# Patient Record
Sex: MALE | Race: WHITE | NOT HISPANIC OR LATINO | Employment: OTHER | URBAN - METROPOLITAN AREA
[De-identification: names, ages, dates, MRNs, and addresses within clinical notes are randomized per-mention and may not be internally consistent; named-entity substitution may affect disease eponyms.]

---

## 2018-11-21 ENCOUNTER — HOSPITAL ENCOUNTER (OUTPATIENT)
Dept: LAB | Facility: MEDICAL CENTER | Age: 78
End: 2018-11-21
Payer: MEDICARE

## 2018-11-21 LAB
ALBUMIN SERPL BCP-MCNC: 3.8 G/DL (ref 3.2–4.9)
ALBUMIN/GLOB SERPL: 1.4 G/DL
ALP SERPL-CCNC: 95 U/L (ref 30–99)
ALT SERPL-CCNC: 15 U/L (ref 2–50)
ANION GAP SERPL CALC-SCNC: 9 MMOL/L (ref 0–11.9)
AST SERPL-CCNC: 51 U/L (ref 12–45)
BASOPHILS # BLD AUTO: 0.2 % (ref 0–1.8)
BASOPHILS # BLD: 0.01 K/UL (ref 0–0.12)
BILIRUB SERPL-MCNC: 0.6 MG/DL (ref 0.1–1.5)
BUN SERPL-MCNC: 17 MG/DL (ref 8–22)
CALCIUM SERPL-MCNC: 9.5 MG/DL (ref 8.5–10.5)
CHLORIDE SERPL-SCNC: 103 MMOL/L (ref 96–112)
CO2 SERPL-SCNC: 25 MMOL/L (ref 20–33)
CREAT SERPL-MCNC: 1.37 MG/DL (ref 0.5–1.4)
DIGOXIN SERPL-MCNC: 0.3 NG/ML (ref 0.8–2)
EOSINOPHIL # BLD AUTO: 0.18 K/UL (ref 0–0.51)
EOSINOPHIL NFR BLD: 3.1 % (ref 0–6.9)
ERYTHROCYTE [DISTWIDTH] IN BLOOD BY AUTOMATED COUNT: 50.5 FL (ref 35.9–50)
GLOBULIN SER CALC-MCNC: 2.7 G/DL (ref 1.9–3.5)
GLUCOSE SERPL-MCNC: 101 MG/DL (ref 65–99)
HCT VFR BLD AUTO: 46.3 % (ref 42–52)
HGB BLD-MCNC: 15 G/DL (ref 14–18)
IMM GRANULOCYTES # BLD AUTO: 0.03 K/UL (ref 0–0.11)
IMM GRANULOCYTES NFR BLD AUTO: 0.5 % (ref 0–0.9)
LYMPHOCYTES # BLD AUTO: 1.18 K/UL (ref 1–4.8)
LYMPHOCYTES NFR BLD: 20.1 % (ref 22–41)
MCH RBC QN AUTO: 28.7 PG (ref 27–33)
MCHC RBC AUTO-ENTMCNC: 32.4 G/DL (ref 33.7–35.3)
MCV RBC AUTO: 88.7 FL (ref 81.4–97.8)
MONOCYTES # BLD AUTO: 0.31 K/UL (ref 0–0.85)
MONOCYTES NFR BLD AUTO: 5.3 % (ref 0–13.4)
NEUTROPHILS # BLD AUTO: 4.15 K/UL (ref 1.82–7.42)
NEUTROPHILS NFR BLD: 70.8 % (ref 44–72)
NRBC # BLD AUTO: 0 K/UL
NRBC BLD-RTO: 0 /100 WBC
PLATELET # BLD AUTO: 341 K/UL (ref 164–446)
PMV BLD AUTO: 9.6 FL (ref 9–12.9)
POTASSIUM SERPL-SCNC: 3.9 MMOL/L (ref 3.6–5.5)
PROT SERPL-MCNC: 6.5 G/DL (ref 6–8.2)
RBC # BLD AUTO: 5.22 M/UL (ref 4.7–6.1)
SODIUM SERPL-SCNC: 137 MMOL/L (ref 135–145)
TSH SERPL DL<=0.005 MIU/L-ACNC: 1.1 UIU/ML (ref 0.38–5.33)
WBC # BLD AUTO: 5.9 K/UL (ref 4.8–10.8)

## 2018-11-21 PROCEDURE — 36415 COLL VENOUS BLD VENIPUNCTURE: CPT

## 2018-11-21 PROCEDURE — 80162 ASSAY OF DIGOXIN TOTAL: CPT

## 2018-11-21 PROCEDURE — 85025 COMPLETE CBC W/AUTO DIFF WBC: CPT

## 2018-11-21 PROCEDURE — 84443 ASSAY THYROID STIM HORMONE: CPT

## 2018-11-21 PROCEDURE — 80053 COMPREHEN METABOLIC PANEL: CPT

## 2018-12-02 ENCOUNTER — APPOINTMENT (OUTPATIENT)
Dept: RADIOLOGY | Facility: MEDICAL CENTER | Age: 78
End: 2018-12-02
Attending: EMERGENCY MEDICINE
Payer: MEDICARE

## 2018-12-02 ENCOUNTER — HOSPITAL ENCOUNTER (EMERGENCY)
Facility: MEDICAL CENTER | Age: 78
End: 2018-12-02
Attending: EMERGENCY MEDICINE
Payer: MEDICARE

## 2018-12-02 VITALS
DIASTOLIC BLOOD PRESSURE: 62 MMHG | TEMPERATURE: 97 F | RESPIRATION RATE: 16 BRPM | OXYGEN SATURATION: 93 % | HEART RATE: 75 BPM | BODY MASS INDEX: 28.23 KG/M2 | SYSTOLIC BLOOD PRESSURE: 139 MMHG | WEIGHT: 220 LBS | HEIGHT: 74 IN

## 2018-12-02 DIAGNOSIS — R05.9 COUGH: ICD-10-CM

## 2018-12-02 DIAGNOSIS — J40 BRONCHITIS: ICD-10-CM

## 2018-12-02 LAB
ALBUMIN SERPL BCP-MCNC: 3.8 G/DL (ref 3.2–4.9)
ALBUMIN/GLOB SERPL: 1.4 G/DL
ALP SERPL-CCNC: 84 U/L (ref 30–99)
ALT SERPL-CCNC: 11 U/L (ref 2–50)
ANION GAP SERPL CALC-SCNC: 10 MMOL/L (ref 0–11.9)
AST SERPL-CCNC: 56 U/L (ref 12–45)
BASOPHILS # BLD AUTO: 0.2 % (ref 0–1.8)
BASOPHILS # BLD: 0.02 K/UL (ref 0–0.12)
BILIRUB SERPL-MCNC: 0.8 MG/DL (ref 0.1–1.5)
BLOOD CULTURE HOLD CXBCH: NORMAL
BNP SERPL-MCNC: 148 PG/ML (ref 0–100)
BUN SERPL-MCNC: 13 MG/DL (ref 8–22)
CALCIUM SERPL-MCNC: 9.1 MG/DL (ref 8.5–10.5)
CHLORIDE SERPL-SCNC: 104 MMOL/L (ref 96–112)
CO2 SERPL-SCNC: 21 MMOL/L (ref 20–33)
CREAT SERPL-MCNC: 1.2 MG/DL (ref 0.5–1.4)
EOSINOPHIL # BLD AUTO: 0.05 K/UL (ref 0–0.51)
EOSINOPHIL NFR BLD: 0.6 % (ref 0–6.9)
ERYTHROCYTE [DISTWIDTH] IN BLOOD BY AUTOMATED COUNT: 51.3 FL (ref 35.9–50)
FLUAV RNA SPEC QL NAA+PROBE: NEGATIVE
FLUBV RNA SPEC QL NAA+PROBE: NEGATIVE
GLOBULIN SER CALC-MCNC: 2.7 G/DL (ref 1.9–3.5)
GLUCOSE SERPL-MCNC: 97 MG/DL (ref 65–99)
HCT VFR BLD AUTO: 44.6 % (ref 42–52)
HGB BLD-MCNC: 14.7 G/DL (ref 14–18)
IMM GRANULOCYTES # BLD AUTO: 0.06 K/UL (ref 0–0.11)
IMM GRANULOCYTES NFR BLD AUTO: 0.7 % (ref 0–0.9)
INR PPP: 1.05 (ref 0.87–1.13)
LACTATE BLD-SCNC: 2.1 MMOL/L (ref 0.5–2)
LIPASE SERPL-CCNC: 25 U/L (ref 11–82)
LYMPHOCYTES # BLD AUTO: 0.97 K/UL (ref 1–4.8)
LYMPHOCYTES NFR BLD: 11 % (ref 22–41)
MCH RBC QN AUTO: 29 PG (ref 27–33)
MCHC RBC AUTO-ENTMCNC: 33 G/DL (ref 33.7–35.3)
MCV RBC AUTO: 88 FL (ref 81.4–97.8)
MONOCYTES # BLD AUTO: 0.31 K/UL (ref 0–0.85)
MONOCYTES NFR BLD AUTO: 3.5 % (ref 0–13.4)
NEUTROPHILS # BLD AUTO: 7.42 K/UL (ref 1.82–7.42)
NEUTROPHILS NFR BLD: 84 % (ref 44–72)
NRBC # BLD AUTO: 0 K/UL
NRBC BLD-RTO: 0 /100 WBC
PLATELET # BLD AUTO: 311 K/UL (ref 164–446)
PMV BLD AUTO: 9.3 FL (ref 9–12.9)
POTASSIUM SERPL-SCNC: 4.2 MMOL/L (ref 3.6–5.5)
PROT SERPL-MCNC: 6.5 G/DL (ref 6–8.2)
PROTHROMBIN TIME: 13.8 SEC (ref 12–14.6)
RBC # BLD AUTO: 5.07 M/UL (ref 4.7–6.1)
SODIUM SERPL-SCNC: 135 MMOL/L (ref 135–145)
T4 FREE SERPL-MCNC: 0.61 NG/DL (ref 0.53–1.43)
TROPONIN I SERPL-MCNC: 0.02 NG/ML (ref 0–0.04)
TSH SERPL DL<=0.005 MIU/L-ACNC: 1.32 UIU/ML (ref 0.38–5.33)
WBC # BLD AUTO: 8.8 K/UL (ref 4.8–10.8)

## 2018-12-02 PROCEDURE — 71045 X-RAY EXAM CHEST 1 VIEW: CPT

## 2018-12-02 PROCEDURE — 700105 HCHG RX REV CODE 258: Performed by: EMERGENCY MEDICINE

## 2018-12-02 PROCEDURE — A9270 NON-COVERED ITEM OR SERVICE: HCPCS | Performed by: EMERGENCY MEDICINE

## 2018-12-02 PROCEDURE — 85610 PROTHROMBIN TIME: CPT

## 2018-12-02 PROCEDURE — 87502 INFLUENZA DNA AMP PROBE: CPT

## 2018-12-02 PROCEDURE — 83690 ASSAY OF LIPASE: CPT

## 2018-12-02 PROCEDURE — 36415 COLL VENOUS BLD VENIPUNCTURE: CPT

## 2018-12-02 PROCEDURE — 85025 COMPLETE CBC W/AUTO DIFF WBC: CPT

## 2018-12-02 PROCEDURE — 96365 THER/PROPH/DIAG IV INF INIT: CPT

## 2018-12-02 PROCEDURE — 700102 HCHG RX REV CODE 250 W/ 637 OVERRIDE(OP): Performed by: EMERGENCY MEDICINE

## 2018-12-02 PROCEDURE — 84484 ASSAY OF TROPONIN QUANT: CPT

## 2018-12-02 PROCEDURE — 99285 EMERGENCY DEPT VISIT HI MDM: CPT

## 2018-12-02 PROCEDURE — 83605 ASSAY OF LACTIC ACID: CPT

## 2018-12-02 PROCEDURE — 84439 ASSAY OF FREE THYROXINE: CPT

## 2018-12-02 PROCEDURE — 84443 ASSAY THYROID STIM HORMONE: CPT

## 2018-12-02 PROCEDURE — 80053 COMPREHEN METABOLIC PANEL: CPT

## 2018-12-02 PROCEDURE — 83880 ASSAY OF NATRIURETIC PEPTIDE: CPT

## 2018-12-02 PROCEDURE — 700111 HCHG RX REV CODE 636 W/ 250 OVERRIDE (IP): Performed by: EMERGENCY MEDICINE

## 2018-12-02 RX ORDER — QUETIAPINE FUMARATE 100 MG/1
100 TABLET, FILM COATED ORAL
COMMUNITY

## 2018-12-02 RX ORDER — ESCITALOPRAM OXALATE 10 MG/1
20 TABLET ORAL DAILY
COMMUNITY
End: 2019-01-15

## 2018-12-02 RX ORDER — DIGOXIN 125 MCG
125 TABLET ORAL DAILY
COMMUNITY
End: 2019-01-15

## 2018-12-02 RX ORDER — DONEPEZIL HYDROCHLORIDE 5 MG/1
5 TABLET, FILM COATED ORAL NIGHTLY
COMMUNITY

## 2018-12-02 RX ORDER — AZITHROMYCIN 250 MG/1
TABLET, FILM COATED ORAL
Qty: 6 TAB | Refills: 0 | Status: SHIPPED | OUTPATIENT
Start: 2018-12-02 | End: 2019-01-15

## 2018-12-02 RX ORDER — AZITHROMYCIN 250 MG/1
500 TABLET, FILM COATED ORAL ONCE
Status: COMPLETED | OUTPATIENT
Start: 2018-12-02 | End: 2018-12-02

## 2018-12-02 RX ORDER — DIVALPROEX SODIUM 250 MG/1
250 TABLET, DELAYED RELEASE ORAL DAILY
COMMUNITY

## 2018-12-02 RX ORDER — SODIUM CHLORIDE 9 MG/ML
1000 INJECTION, SOLUTION INTRAVENOUS ONCE
Status: COMPLETED | OUTPATIENT
Start: 2018-12-02 | End: 2018-12-02

## 2018-12-02 RX ADMIN — AZITHROMYCIN 500 MG: 250 TABLET, FILM COATED ORAL at 13:21

## 2018-12-02 RX ADMIN — CEFTRIAXONE SODIUM 2 G: 2 INJECTION, POWDER, FOR SOLUTION INTRAMUSCULAR; INTRAVENOUS at 13:21

## 2018-12-02 RX ADMIN — SODIUM CHLORIDE 1000 ML: 9 INJECTION, SOLUTION INTRAVENOUS at 11:02

## 2018-12-02 ASSESSMENT — LIFESTYLE VARIABLES: DO YOU DRINK ALCOHOL: NO

## 2018-12-02 NOTE — ED TRIAGE NOTES
"Chief Complaint   Patient presents with   • Blurred Vision   • Cough   BIB EMS from senior living. Blurred vision x 2 days, has had a L eye infection x 2 months. Also reporting dry cough.     /78   Pulse 79   Temp 36.1 °C (97 °F) (Temporal)   Resp 16   Ht 1.88 m (6' 2\")   Wt 99.8 kg (220 lb)   SpO2 95%   BMI 28.25 kg/m²     Pt Informed regarding triage process and verbalized understanding to inform triage tech or RN for any changes in condition.  Placed in lobby.    "

## 2018-12-02 NOTE — ED NOTES
This RN called [facility] in [place] where pt lives, and they stated their bus does not run on Sundays. Pt also tried calling his daughter for a ride home, but she did not answer. Bere at [facility] is attempting to arrange a cab paid for by their facility and will call back shortly.

## 2018-12-02 NOTE — ED PROVIDER NOTES
ED Provider Note  CHIEF COMPLAINT  Chief Complaint   Patient presents with   • Blurred Vision   • Cough       HPI  Paulie Melgar is a 78 y.o. male who presents complaining of cough cold congestion and weakness and not feeling well over the last few days.  Lives in a nursing home type facility.  Is also had some blurring of vision in his left eye for several months.  No acute vision loss.  No headache, no stiff neck, no chest pain, no abdominal pain.  No nausea or vomiting.  He is felt weak and tired with no energy.    REVIEW OF SYSTEMS  No headache, no chest pain, no abdominal pain.  No vomiting or diarrhea.  No melena.  No fever.  ALL OTHER SYSTEMS NEGATIVE    ALLERGIES  Allergies   Allergen Reactions   • Pcn [Penicillins] Anaphylaxis       CURRENT MEDICATIONS  Home Medications     Reviewed by Lillie Schaefer R.N. (Registered Nurse) on 12/02/18 at 0947  Med List Status: Complete   Medication Last Dose Status   ASA Buff, Mag Carb-Al Glyc, 325 MG TABS Taking Active   atenolol (TENORMIN) 25 MG TABS Not Taking Active   atenolol (TENORMIN) 50 MG TABS Not Taking Active   cloNIDine (CATAPRES) 0.1 MG/24HR PATCH WEEKLY  Active   digoxin (LANOXIN) 125 MCG Tab  Active   divalproex (DEPAKOTE) 250 MG Tablet Delayed Response  Active   donepezil (ARICEPT) 5 MG Tab  Active   escitalopram (LEXAPRO) 10 MG Tab  Active   levothyroxine (LEVOXYL) 75 MCG TABS Taking Active   omeprazole (PRILOSEC) 40 MG capsule Taking Active   QUEtiapine (SEROQUEL) 100 MG Tab  Active   rivaroxaban (XARELTO) 20 MG Tab tablet  Active                PAST MEDICAL HISTORY  Past Medical History:   Diagnosis Date   • Cancer (HCC) 1990    lymphoma and melanoma   • GERD (gastroesophageal reflux disease)    • Hodgkins disease (HCC) 2/2/2010   • Hypertension    • Hypothyroid 2/2/2010   • Insomnia 3/22/2010   • Melanoma (HCC) 2/2/2010   • Melanoma in situ of back (HCC) 2/2/2010   • Neuropathy (HCC) 2/11/2010   • Syncopal episodes 2/2/2010   • Thyroid disease      "hypothyroidism       SURGICAL HISTORY  Past Surgical History:   Procedure Laterality Date   • ABDOMINAL EXPLORATION      for hodgkins lymphoma   • APPENDECTOMY         FAMILY HISTORY  Family History   Problem Relation Age of Onset   • Heart Disease Mother        SOCIAL HISTORY  Non-smoker.    PHYSICAL EXAM  GENERAL: Alert male adult  VITAL SIGNS: /78   Pulse 79   Temp 36.1 °C (97 °F) (Temporal)   Resp 16   Ht 1.88 m (6' 2\")   Wt 99.8 kg (220 lb)   SpO2 95%   BMI 28.25 kg/m²    Constitutional: Alert dehydrated appearing male with dry mucous membranes.  Adult HENT: Scalp is normal size and nontender. Ears are clear. Nose is clear. Throat is clear with no stridor no drooling no trismus. Teeth are all intact.  Eyes: Pupils equal round and reactive to light, extraocular motor fall. There is no scleral icterus.  Cataracts in both eyes.  Corneas are both clear.  Neck: Neck is supple and nontender. There is no meningismus. No adenitis. No thyromegaly.  Lymphatic: No adenopathy.   Cardiovascular: Heart regular rhythm without murmurs or gallops   Thorax & Lungs: No chest wall tenderness. Lungs are clear. Patient has good breath sounds bilateral. No rales, no rhonchi, no wheezes.  Abdomen: Abdomen is soft, nontender, not rigid, no guarding, and no organomegaly. There is no palpable hernia   Skin: Warm, pink, and dry with no erythema and no rash.   Back: Nontender, no midline bony tenderness, no flank tenderness.  Extremities: Full range of motion  No tenderness to palpation and no deformities noted. No calf or thigh swelling. No calf or thigh tenderness. No clinical DVT.  Neurologic: Alert & oriented . Cranial nerves are grossly intact as tested. Patient moves all 4 extremities well. Patient has good strong flexion and extension of the ankle joints knee joints hip joints and elbow joints. Sensation is normal and symmetrical in the upper and lower extremities.   Psychiatric: Patient is alert oriented coherent and " rational.       RADIOLOGY/PROCEDURES  DX-CHEST-PORTABLE (1 VIEW)   Final Result      No acute cardiopulmonary abnormality identified.        COURSE & MEDICAL DECISION MAKING  Patient said blurring of vision in his left eye for several months.  His eye exam is normal except for he has cataracts pretty significant cataracts in both eyes.  Is also had cough cold congestion and weakness and feeling tired over the past several days.  He lives in a nursing type facility.  Differential diagnosis: Cough and weakness, pneumonia, bronchitis, influenza, infection, dehydration, etc.    Plan: #1 IV #2 bolus of IV fluids for rehydration.  Unable to tolerate p.o. fluids here early.  3.  Lab evaluation including CBC, CMP, troponin, T4, TSH, lactate level, influenza screen, etc.  This is to rule out infection, anemia, metabolic issues etc.  4.  The patient has cataracts appear to be the cause of his blurring of vision in his left eye for several months.  I recommended ophthalmology follow-up.    Laboratory and reexamination: CBC is normal no anemia no bandemia.  White counts normal.  Lipase is normal no pancreatitis.  Chemistry panel is normal with no renal nor liver dysfunction.  BNP is slightly elevated.  148.  Lactate level is 2.1 which is minimally elevated.    Results for orders placed or performed during the hospital encounter of 12/02/18   CBC WITH DIFFERENTIAL   Result Value Ref Range    WBC 8.8 4.8 - 10.8 K/uL    RBC 5.07 4.70 - 6.10 M/uL    Hemoglobin 14.7 14.0 - 18.0 g/dL    Hematocrit 44.6 42.0 - 52.0 %    MCV 88.0 81.4 - 97.8 fL    MCH 29.0 27.0 - 33.0 pg    MCHC 33.0 (L) 33.7 - 35.3 g/dL    RDW 51.3 (H) 35.9 - 50.0 fL    Platelet Count 311 164 - 446 K/uL    MPV 9.3 9.0 - 12.9 fL    Neutrophils-Polys 84.00 (H) 44.00 - 72.00 %    Lymphocytes 11.00 (L) 22.00 - 41.00 %    Monocytes 3.50 0.00 - 13.40 %    Eosinophils 0.60 0.00 - 6.90 %    Basophils 0.20 0.00 - 1.80 %    Immature Granulocytes 0.70 0.00 - 0.90 %    Nucleated  RBC 0.00 /100 WBC    Neutrophils (Absolute) 7.42 1.82 - 7.42 K/uL    Lymphs (Absolute) 0.97 (L) 1.00 - 4.80 K/uL    Monos (Absolute) 0.31 0.00 - 0.85 K/uL    Eos (Absolute) 0.05 0.00 - 0.51 K/uL    Baso (Absolute) 0.02 0.00 - 0.12 K/uL    Immature Granulocytes (abs) 0.06 0.00 - 0.11 K/uL    NRBC (Absolute) 0.00 K/uL   COMP METABOLIC PANEL   Result Value Ref Range    Sodium 135 135 - 145 mmol/L    Potassium 4.2 3.6 - 5.5 mmol/L    Chloride 104 96 - 112 mmol/L    Co2 21 20 - 33 mmol/L    Anion Gap 10.0 0.0 - 11.9    Glucose 97 65 - 99 mg/dL    Bun 13 8 - 22 mg/dL    Creatinine 1.20 0.50 - 1.40 mg/dL    Calcium 9.1 8.5 - 10.5 mg/dL    AST(SGOT) 56 (H) 12 - 45 U/L    ALT(SGPT) 11 2 - 50 U/L    Alkaline Phosphatase 84 30 - 99 U/L    Total Bilirubin 0.8 0.1 - 1.5 mg/dL    Albumin 3.8 3.2 - 4.9 g/dL    Total Protein 6.5 6.0 - 8.2 g/dL    Globulin 2.7 1.9 - 3.5 g/dL    A-G Ratio 1.4 g/dL   LIPASE   Result Value Ref Range    Lipase 25 11 - 82 U/L   PROTHROMBIN TIME   Result Value Ref Range    PT 13.8 12.0 - 14.6 sec    INR 1.05 0.87 - 1.13   TROPONIN   Result Value Ref Range    Troponin I 0.02 0.00 - 0.04 ng/mL   BTYPE NATRIURETIC PEPTIDE   Result Value Ref Range    B Natriuretic Peptide 148 (H) 0 - 100 pg/mL   TSH   Result Value Ref Range    TSH 1.320 0.380 - 5.330 uIU/mL   Influenza A/B By PCR   Result Value Ref Range    Influenza virus A RNA Negative Negative    Influenza virus B, PCR Negative Negative   LACTIC ACID   Result Value Ref Range    Lactic Acid 2.1 (H) 0.5 - 2.0 mmol/L   FREE THYROXINE   Result Value Ref Range    Free T-4 0.61 0.53 - 1.43 ng/dL   ESTIMATED GFR   Result Value Ref Range    GFR If African American >60 >60 mL/min/1.73 m 2    GFR If Non African American 59 (A) >60 mL/min/1.73 m 2   BLOOD CULTURE,HOLD   Result Value Ref Range    Blood Culture Hold Collected       Patient lives in a nursing home facility via he feels better going back there than staying in the hospital overnight.  His vital signs are  normal.  He is nontoxic.  He has been given Rocephin and Zithromax here.  He has been given a prescription for Zithromax.  He stable for discharge after 1 PM.  Return here as needed.    FINAL IMPRESSION  1.  Cough  2.  Blurring of vision left eye for several months secondary to cataracts  3.  Bronchitis upper respiratory infection with possible early pneumonia.      Electronically signed by: Gary Gansert, 12/2/2018 /1 PM

## 2018-12-02 NOTE — ED NOTES
Antibiotics completed. PIV removed. Pt given discharge instructions and told where to  prescription. Pt verbalized understanding. RN to answer any questions pt had. VSS. Pt wheeled out to front lobby and cab in route to pick patient up to return back to [facility].

## 2019-01-15 ENCOUNTER — HOSPITAL ENCOUNTER (INPATIENT)
Facility: MEDICAL CENTER | Age: 79
LOS: 2 days | DRG: 281 | End: 2019-01-18
Attending: EMERGENCY MEDICINE | Admitting: HOSPITALIST
Payer: MEDICARE

## 2019-01-15 ENCOUNTER — APPOINTMENT (OUTPATIENT)
Dept: RADIOLOGY | Facility: MEDICAL CENTER | Age: 79
DRG: 281 | End: 2019-01-15
Attending: EMERGENCY MEDICINE
Payer: MEDICARE

## 2019-01-15 ENCOUNTER — APPOINTMENT (OUTPATIENT)
Dept: RADIOLOGY | Facility: MEDICAL CENTER | Age: 79
DRG: 281 | End: 2019-01-15
Attending: HOSPITALIST
Payer: MEDICARE

## 2019-01-15 DIAGNOSIS — R06.00 DYSPNEA, UNSPECIFIED TYPE: ICD-10-CM

## 2019-01-15 DIAGNOSIS — R07.9 CHEST PAIN, UNSPECIFIED TYPE: ICD-10-CM

## 2019-01-15 DIAGNOSIS — I48.21 PERMANENT ATRIAL FIBRILLATION (HCC): ICD-10-CM

## 2019-01-15 PROBLEM — F33.42 RECURRENT MAJOR DEPRESSIVE DISORDER, IN FULL REMISSION (HCC): Status: ACTIVE | Noted: 2019-01-15

## 2019-01-15 PROBLEM — R07.2 PRECORDIAL PAIN: Status: ACTIVE | Noted: 2019-01-15

## 2019-01-15 PROBLEM — F02.80 ALZHEIMER'S DISEASE (HCC): Status: ACTIVE | Noted: 2019-01-15

## 2019-01-15 PROBLEM — G30.9 ALZHEIMER'S DISEASE (HCC): Status: ACTIVE | Noted: 2019-01-15

## 2019-01-15 PROBLEM — I71.40 ABDOMINAL AORTIC ANEURYSM (AAA) WITHOUT RUPTURE (HCC): Status: ACTIVE | Noted: 2019-01-15

## 2019-01-15 PROBLEM — R07.81 PLEURITIC CHEST PAIN: Status: ACTIVE | Noted: 2019-01-15

## 2019-01-15 PROBLEM — I10 ESSENTIAL HYPERTENSION: Status: ACTIVE | Noted: 2019-01-15

## 2019-01-15 PROBLEM — I48.0 PAROXYSMAL ATRIAL FIBRILLATION (HCC): Status: ACTIVE | Noted: 2019-01-15

## 2019-01-15 LAB
ALBUMIN SERPL BCP-MCNC: 3.6 G/DL (ref 3.2–4.9)
ALBUMIN/GLOB SERPL: 1.3 G/DL
ALP SERPL-CCNC: 74 U/L (ref 30–99)
ALT SERPL-CCNC: 12 U/L (ref 2–50)
ANION GAP SERPL CALC-SCNC: 10 MMOL/L (ref 0–11.9)
APTT PPP: 26.5 SEC (ref 24.7–36)
AST SERPL-CCNC: 73 U/L (ref 12–45)
BASOPHILS # BLD AUTO: 0.1 % (ref 0–1.8)
BASOPHILS # BLD: 0.01 K/UL (ref 0–0.12)
BILIRUB SERPL-MCNC: 0.7 MG/DL (ref 0.1–1.5)
BNP SERPL-MCNC: 163 PG/ML (ref 0–100)
BUN SERPL-MCNC: 18 MG/DL (ref 8–22)
CALCIUM SERPL-MCNC: 8.9 MG/DL (ref 8.5–10.5)
CHLORIDE SERPL-SCNC: 104 MMOL/L (ref 96–112)
CO2 SERPL-SCNC: 24 MMOL/L (ref 20–33)
CREAT SERPL-MCNC: 1.26 MG/DL (ref 0.5–1.4)
EKG IMPRESSION: NORMAL
EOSINOPHIL # BLD AUTO: 0.15 K/UL (ref 0–0.51)
EOSINOPHIL NFR BLD: 1.6 % (ref 0–6.9)
ERYTHROCYTE [DISTWIDTH] IN BLOOD BY AUTOMATED COUNT: 51.6 FL (ref 35.9–50)
GLOBULIN SER CALC-MCNC: 2.7 G/DL (ref 1.9–3.5)
GLUCOSE SERPL-MCNC: 117 MG/DL (ref 65–99)
HCT VFR BLD AUTO: 40.7 % (ref 42–52)
HGB BLD-MCNC: 13.3 G/DL (ref 14–18)
IMM GRANULOCYTES # BLD AUTO: 0.05 K/UL (ref 0–0.11)
IMM GRANULOCYTES NFR BLD AUTO: 0.5 % (ref 0–0.9)
INR PPP: 1.03 (ref 0.87–1.13)
LIPASE SERPL-CCNC: 27 U/L (ref 11–82)
LYMPHOCYTES # BLD AUTO: 1.22 K/UL (ref 1–4.8)
LYMPHOCYTES NFR BLD: 12.8 % (ref 22–41)
MCH RBC QN AUTO: 28.6 PG (ref 27–33)
MCHC RBC AUTO-ENTMCNC: 32.7 G/DL (ref 33.7–35.3)
MCV RBC AUTO: 87.5 FL (ref 81.4–97.8)
MONOCYTES # BLD AUTO: 0.49 K/UL (ref 0–0.85)
MONOCYTES NFR BLD AUTO: 5.1 % (ref 0–13.4)
NEUTROPHILS # BLD AUTO: 7.6 K/UL (ref 1.82–7.42)
NEUTROPHILS NFR BLD: 79.9 % (ref 44–72)
NRBC # BLD AUTO: 0 K/UL
NRBC BLD-RTO: 0 /100 WBC
PLATELET # BLD AUTO: 360 K/UL (ref 164–446)
PMV BLD AUTO: 9.7 FL (ref 9–12.9)
POTASSIUM SERPL-SCNC: 3.7 MMOL/L (ref 3.6–5.5)
PROT SERPL-MCNC: 6.3 G/DL (ref 6–8.2)
PROTHROMBIN TIME: 13.6 SEC (ref 12–14.6)
RBC # BLD AUTO: 4.65 M/UL (ref 4.7–6.1)
SODIUM SERPL-SCNC: 138 MMOL/L (ref 135–145)
TROPONIN I SERPL-MCNC: 0.05 NG/ML (ref 0–0.04)
TROPONIN I SERPL-MCNC: 307.36 NG/ML (ref 0–0.04)
WBC # BLD AUTO: 9.5 K/UL (ref 4.8–10.8)

## 2019-01-15 PROCEDURE — 36415 COLL VENOUS BLD VENIPUNCTURE: CPT

## 2019-01-15 PROCEDURE — 700117 HCHG RX CONTRAST REV CODE 255: Performed by: HOSPITALIST

## 2019-01-15 PROCEDURE — 93005 ELECTROCARDIOGRAM TRACING: CPT

## 2019-01-15 PROCEDURE — G0378 HOSPITAL OBSERVATION PER HR: HCPCS

## 2019-01-15 PROCEDURE — 700102 HCHG RX REV CODE 250 W/ 637 OVERRIDE(OP): Performed by: INTERNAL MEDICINE

## 2019-01-15 PROCEDURE — A9270 NON-COVERED ITEM OR SERVICE: HCPCS | Performed by: INTERNAL MEDICINE

## 2019-01-15 PROCEDURE — 304561 HCHG STAT O2

## 2019-01-15 PROCEDURE — 70450 CT HEAD/BRAIN W/O DYE: CPT

## 2019-01-15 PROCEDURE — 93005 ELECTROCARDIOGRAM TRACING: CPT | Performed by: EMERGENCY MEDICINE

## 2019-01-15 PROCEDURE — 83690 ASSAY OF LIPASE: CPT

## 2019-01-15 PROCEDURE — 85025 COMPLETE CBC W/AUTO DIFF WBC: CPT

## 2019-01-15 PROCEDURE — 85730 THROMBOPLASTIN TIME PARTIAL: CPT

## 2019-01-15 PROCEDURE — 71275 CT ANGIOGRAPHY CHEST: CPT

## 2019-01-15 PROCEDURE — 84484 ASSAY OF TROPONIN QUANT: CPT

## 2019-01-15 PROCEDURE — 700102 HCHG RX REV CODE 250 W/ 637 OVERRIDE(OP): Performed by: HOSPITALIST

## 2019-01-15 PROCEDURE — 80053 COMPREHEN METABOLIC PANEL: CPT

## 2019-01-15 PROCEDURE — 83880 ASSAY OF NATRIURETIC PEPTIDE: CPT

## 2019-01-15 PROCEDURE — 93005 ELECTROCARDIOGRAM TRACING: CPT | Performed by: HOSPITALIST

## 2019-01-15 PROCEDURE — 99220 PR INITIAL OBSERVATION CARE,LEVL III: CPT | Performed by: HOSPITALIST

## 2019-01-15 PROCEDURE — 71045 X-RAY EXAM CHEST 1 VIEW: CPT

## 2019-01-15 PROCEDURE — 85610 PROTHROMBIN TIME: CPT

## 2019-01-15 PROCEDURE — 99285 EMERGENCY DEPT VISIT HI MDM: CPT

## 2019-01-15 PROCEDURE — A9270 NON-COVERED ITEM OR SERVICE: HCPCS | Performed by: HOSPITALIST

## 2019-01-15 RX ORDER — DIVALPROEX SODIUM 250 MG/1
250 TABLET, DELAYED RELEASE ORAL DAILY
Status: DISCONTINUED | OUTPATIENT
Start: 2019-01-15 | End: 2019-01-18 | Stop reason: HOSPADM

## 2019-01-15 RX ORDER — BISACODYL 10 MG
10 SUPPOSITORY, RECTAL RECTAL
Status: DISCONTINUED | OUTPATIENT
Start: 2019-01-15 | End: 2019-01-18 | Stop reason: HOSPADM

## 2019-01-15 RX ORDER — ASPIRIN 81 MG
1 TABLET,CHEWABLE ORAL 4 TIMES DAILY PRN
COMMUNITY

## 2019-01-15 RX ORDER — OMEPRAZOLE 20 MG/1
20 CAPSULE, DELAYED RELEASE ORAL DAILY
COMMUNITY

## 2019-01-15 RX ORDER — ESCITALOPRAM OXALATE 20 MG/1
20 TABLET ORAL DAILY
COMMUNITY

## 2019-01-15 RX ORDER — LOPERAMIDE HYDROCHLORIDE 2 MG/1
2 CAPSULE ORAL 4 TIMES DAILY PRN
Status: DISCONTINUED | OUTPATIENT
Start: 2019-01-15 | End: 2019-01-18 | Stop reason: HOSPADM

## 2019-01-15 RX ORDER — AMOXICILLIN 250 MG
2 CAPSULE ORAL 2 TIMES DAILY
Status: DISCONTINUED | OUTPATIENT
Start: 2019-01-15 | End: 2019-01-15

## 2019-01-15 RX ORDER — ESCITALOPRAM OXALATE 10 MG/1
20 TABLET ORAL DAILY
Status: DISCONTINUED | OUTPATIENT
Start: 2019-01-16 | End: 2019-01-18 | Stop reason: HOSPADM

## 2019-01-15 RX ORDER — LEVOTHYROXINE SODIUM 0.05 MG/1
25 TABLET ORAL
Status: DISCONTINUED | OUTPATIENT
Start: 2019-01-16 | End: 2019-01-18 | Stop reason: HOSPADM

## 2019-01-15 RX ORDER — DONEPEZIL HYDROCHLORIDE 5 MG/1
5 TABLET, FILM COATED ORAL NIGHTLY
Status: DISCONTINUED | OUTPATIENT
Start: 2019-01-15 | End: 2019-01-18 | Stop reason: HOSPADM

## 2019-01-15 RX ORDER — ALPRAZOLAM 0.25 MG/1
0.25 TABLET ORAL 3 TIMES DAILY PRN
Status: DISCONTINUED | OUTPATIENT
Start: 2019-01-15 | End: 2019-01-18 | Stop reason: HOSPADM

## 2019-01-15 RX ORDER — LEVOTHYROXINE SODIUM 0.03 MG/1
25 TABLET ORAL
COMMUNITY

## 2019-01-15 RX ORDER — OMEPRAZOLE 20 MG/1
20 CAPSULE, DELAYED RELEASE ORAL DAILY
Status: DISCONTINUED | OUTPATIENT
Start: 2019-01-15 | End: 2019-01-18 | Stop reason: HOSPADM

## 2019-01-15 RX ORDER — QUETIAPINE FUMARATE 100 MG/1
100 TABLET, FILM COATED ORAL
Status: DISCONTINUED | OUTPATIENT
Start: 2019-01-15 | End: 2019-01-18 | Stop reason: HOSPADM

## 2019-01-15 RX ORDER — DIGOXIN 125 MCG
250 TABLET ORAL
Status: DISCONTINUED | OUTPATIENT
Start: 2019-01-15 | End: 2019-01-18 | Stop reason: HOSPADM

## 2019-01-15 RX ORDER — POLYETHYLENE GLYCOL 3350 17 G/17G
1 POWDER, FOR SOLUTION ORAL
Status: DISCONTINUED | OUTPATIENT
Start: 2019-01-15 | End: 2019-01-18 | Stop reason: HOSPADM

## 2019-01-15 RX ORDER — DIGOXIN 250 MCG
250 TABLET ORAL
COMMUNITY

## 2019-01-15 RX ADMIN — ALPRAZOLAM 0.25 MG: 0.25 TABLET ORAL at 21:54

## 2019-01-15 RX ADMIN — RIVAROXABAN 20 MG: 20 TABLET, FILM COATED ORAL at 18:29

## 2019-01-15 RX ADMIN — IOHEXOL 100 ML: 350 INJECTION, SOLUTION INTRAVENOUS at 16:37

## 2019-01-15 RX ADMIN — QUETIAPINE FUMARATE 100 MG: 100 TABLET ORAL at 19:36

## 2019-01-15 ASSESSMENT — PAIN SCALES - GENERAL
PAINLEVEL_OUTOF10: 6
PAINLEVEL_OUTOF10: 0
PAINLEVEL_OUTOF10: 4
PAINLEVEL_OUTOF10: 6

## 2019-01-15 ASSESSMENT — COGNITIVE AND FUNCTIONAL STATUS - GENERAL
MOBILITY SCORE: 21
SUGGESTED CMS G CODE MODIFIER MOBILITY: CJ
WALKING IN HOSPITAL ROOM: A LITTLE
STANDING UP FROM CHAIR USING ARMS: A LITTLE
CLIMB 3 TO 5 STEPS WITH RAILING: A LITTLE
DRESSING REGULAR UPPER BODY CLOTHING: A LITTLE
SUGGESTED CMS G CODE MODIFIER DAILY ACTIVITY: CI
DAILY ACTIVITIY SCORE: 23

## 2019-01-15 ASSESSMENT — PATIENT HEALTH QUESTIONNAIRE - PHQ9
2. FEELING DOWN, DEPRESSED, IRRITABLE, OR HOPELESS: NOT AT ALL
SUM OF ALL RESPONSES TO PHQ9 QUESTIONS 1 AND 2: 0
1. LITTLE INTEREST OR PLEASURE IN DOING THINGS: NOT AT ALL

## 2019-01-15 ASSESSMENT — LIFESTYLE VARIABLES
EVER_SMOKED: YES
DO YOU DRINK ALCOHOL: NO

## 2019-01-15 ASSESSMENT — ENCOUNTER SYMPTOMS
RESPIRATORY NEGATIVE: 1
CONSTITUTIONAL NEGATIVE: 1
GASTROINTESTINAL NEGATIVE: 1
MUSCULOSKELETAL NEGATIVE: 1
PSYCHIATRIC NEGATIVE: 1
NEUROLOGICAL NEGATIVE: 1

## 2019-01-15 NOTE — ED NOTES
Med rec complete per MAR and Pt  Per pt he has not been using Diclofenac Gel for shoulder.   Per [facility] Pt on Digoxin QOD not daily.  Allergies reviewed

## 2019-01-15 NOTE — ED PROVIDER NOTES
ED Provider Note    CHIEF COMPLAINT  Chief Complaint   Patient presents with   • Chest Pain     patient reports onset of mid chest pain radiating to right arm.       HPI  Paulie Melgar is a 78 y.o. male who presents complaining of chest pain in the mid chest rating to his right arm.  He states it is mostly in the front of his chest.  He has some associated shortness of breath.  He denies any fevers or chills or productive cough.  He was seen in the beginning of December for cold-like symptoms but has been doing well since.  He denies any leg swelling.  He denies any PND orthopnea.  He does have a history of atrial fibrillation for which he is on digoxin and Xarelto.  The patient has a scab on his forehead and states that he falls all the time and may have fell out of bed yesterday.  He denies any headache vision changes or other bruises or bleeding.    REVIEW OF SYSTEMS  HEENT:  No ear pain, congestion or sore throat   EYES: no discharge redness or vision changes  CARDIAC: Positive chest pain, palpitations    PULMONARY: no dyspnea, cough or congestion   GI: no vomiting diarrhea or abdominal pain   : no dysuria, back pain or hematuria   Neuro: no weakness, numbness aphasia or headache  Musculoskeletal: no swelling deformity or pain no joint swelling frequent falls  Endocrine: no fevers, sweating, weight loss   SKIN: no rash, erythema positive for left scalp contusions     See history of present illness all other systems are negative      PAST MEDICAL HISTORY  Past Medical History:   Diagnosis Date   • Insomnia 3/22/2010   • Neuropathy (HCC) 2/11/2010   • Hypothyroid 2/2/2010   • Melanoma in situ of back (HCC) 2/2/2010   • Melanoma (HCC) 2/2/2010   • Hodgkins disease (HCC) 2/2/2010   • Syncopal episodes 2/2/2010   • Cancer (HCC) 1990    lymphoma and melanoma   • GERD (gastroesophageal reflux disease)    • Hypertension    • Thyroid disease     hypothyroidism       FAMILY HISTORY  Family History   Problem Relation Age of  "Onset   • Heart Disease Mother        SOCIAL HISTORY  Social History     Social History   • Marital status:      Spouse name: N/A   • Number of children: N/A   • Years of education: N/A     Social History Main Topics   • Smoking status: Former Smoker     Quit date: 2/2/2000   • Smokeless tobacco: Never Used      Comment: smoked for 35 yrs before he quit in 2000   • Alcohol use No   • Drug use: No   • Sexual activity: Not on file     Other Topics Concern   • Not on file     Social History Narrative   • No narrative on file       SURGICAL HISTORY  Past Surgical History:   Procedure Laterality Date   • ABDOMINAL EXPLORATION      for hodgkins lymphoma   • APPENDECTOMY         CURRENT MEDICATIONS  Home Medications     Reviewed by Artem Thompson (Pharmacy Tech) on 01/15/19 at 1318  Med List Status: Complete   Medication Last Dose Status   Capsaicin (CAPZASIN-HP) 0.1 % Cream 1/15/2019 Active   cloNIDine (CATAPRES) 0.1 MG/24HR PATCH WEEKLY 1/10/2019 Active   digoxin (LANOXIN) 250 MCG Tab 1/14/2019 Active   divalproex (DEPAKOTE) 250 MG Tablet Delayed Response 1/14/2019 Active   donepezil (ARICEPT) 5 MG Tab 1/8/2019 Active   escitalopram (LEXAPRO) 20 MG tablet 1/15/2019 Active   levothyroxine (SYNTHROID) 25 MCG Tab 1/15/2019 Active   omeprazole (PRILOSEC) 20 MG delayed-release capsule 1/8/2019 Active   QUEtiapine (SEROQUEL) 100 MG Tab 1/8/2019 Active   rivaroxaban (XARELTO) 20 MG Tab tablet 1/14/2019 Active                ALLERGIES  Allergies   Allergen Reactions   • Pcn [Penicillins] Anaphylaxis       PHYSICAL EXAM  VITAL SIGNS: /63   Pulse 77   Temp 36.3 °C (97.4 °F) (Temporal)   Resp 19   Ht 1.88 m (6' 2\")   Wt 86 kg (189 lb 9.5 oz)   SpO2 100%   BMI 24.34 kg/m²  Room air O2: 94    Constitutional: Well developed, Well nourished, No acute distress, Non-toxic appearance.   HENT: Normocephali small left scalp contusion 3 cm x 2 cm without active bleeding or cephalhematoma bilateral external ears " normal, Oropharynx moist, No oral exudates, Nose normal.   Eyes: PERRLA, EOMI, Conjunctiva normal, No discharge.   Neck: Normal range of motion, No tenderness, Supple, No stridor.   Cardiovascular: Irregularly irregular no murmurs rubs or gallops  Thorax & Lungs: There to auscultation bilaterally without wheezes rales or rhonchi  Abdomen: Bowel sounds normal, Soft, No tenderness, No masses, No pulsatile masses.   Skin: Warm, Dry, No erythema, No rash.   Back: No tenderness, No CVA tenderness.   Extremities: Intact distal pulses, No tenderness, No cyanosis, No clubbing.  Negative edema negative cording negative Homans sign  Neurologic: Alert & oriented x 3, Normal motor function, Normal sensory function, No focal deficits noted.         RADIOLOGY/PROCEDURES  CT-HEAD W/O   Final Result      No acute intracranial abnormality is identified.      There are periventricular and subcortical white matter changes present.  This finding is nonspecific and could be from previous small vessel ischemia, demyelination, or gliosis.      Mild atrophy.      Mild mucosal thickening in the right maxillary sinus.      DX-CHEST-LIMITED (1 VIEW)   Final Result      No acute cardiopulmonary process is identified.      Atherosclerotic plaque.                  COURSE & MEDICAL DECISION MAKING  Pertinent Labs & Imaging studies reviewed. (See chart for details)  Differential diagnosis: Cardiac chest pain, pneumonia, pleurisy, fall with closed head injury versus intracranial hemorrhage    Results for orders placed or performed during the hospital encounter of 01/15/19   Troponin   Result Value Ref Range    Troponin I 0.05 (H) 0.00 - 0.04 ng/mL   Btype Natriuretic Peptide   Result Value Ref Range    B Natriuretic Peptide 163 (H) 0 - 100 pg/mL   CBC with Differential   Result Value Ref Range    WBC 9.5 4.8 - 10.8 K/uL    RBC 4.65 (L) 4.70 - 6.10 M/uL    Hemoglobin 13.3 (L) 14.0 - 18.0 g/dL    Hematocrit 40.7 (L) 42.0 - 52.0 %    MCV 87.5 81.4 -  97.8 fL    MCH 28.6 27.0 - 33.0 pg    MCHC 32.7 (L) 33.7 - 35.3 g/dL    RDW 51.6 (H) 35.9 - 50.0 fL    Platelet Count 360 164 - 446 K/uL    MPV 9.7 9.0 - 12.9 fL    Neutrophils-Polys 79.90 (H) 44.00 - 72.00 %    Lymphocytes 12.80 (L) 22.00 - 41.00 %    Monocytes 5.10 0.00 - 13.40 %    Eosinophils 1.60 0.00 - 6.90 %    Basophils 0.10 0.00 - 1.80 %    Immature Granulocytes 0.50 0.00 - 0.90 %    Nucleated RBC 0.00 /100 WBC    Neutrophils (Absolute) 7.60 (H) 1.82 - 7.42 K/uL    Lymphs (Absolute) 1.22 1.00 - 4.80 K/uL    Monos (Absolute) 0.49 0.00 - 0.85 K/uL    Eos (Absolute) 0.15 0.00 - 0.51 K/uL    Baso (Absolute) 0.01 0.00 - 0.12 K/uL    Immature Granulocytes (abs) 0.05 0.00 - 0.11 K/uL    NRBC (Absolute) 0.00 K/uL   Complete Metabolic Panel (CMP)   Result Value Ref Range    Sodium 138 135 - 145 mmol/L    Potassium 3.7 3.6 - 5.5 mmol/L    Chloride 104 96 - 112 mmol/L    Co2 24 20 - 33 mmol/L    Anion Gap 10.0 0.0 - 11.9    Glucose 117 (H) 65 - 99 mg/dL    Bun 18 8 - 22 mg/dL    Creatinine 1.26 0.50 - 1.40 mg/dL    Calcium 8.9 8.5 - 10.5 mg/dL    AST(SGOT) 73 (H) 12 - 45 U/L    ALT(SGPT) 12 2 - 50 U/L    Alkaline Phosphatase 74 30 - 99 U/L    Total Bilirubin 0.7 0.1 - 1.5 mg/dL    Albumin 3.6 3.2 - 4.9 g/dL    Total Protein 6.3 6.0 - 8.2 g/dL    Globulin 2.7 1.9 - 3.5 g/dL    A-G Ratio 1.3 g/dL   Prothrombin Time   Result Value Ref Range    PT 13.6 12.0 - 14.6 sec    INR 1.03 0.87 - 1.13   APTT   Result Value Ref Range    APTT 26.5 24.7 - 36.0 sec   Lipase   Result Value Ref Range    Lipase 27 11 - 82 U/L   ESTIMATED GFR   Result Value Ref Range    GFR If African American >60 >60 mL/min/1.73 m 2    GFR If Non African American 55 (A) >60 mL/min/1.73 m 2   EKG   Result Value Ref Range    Report       [facility] Emergency Dept.    Test Date:  2019-01-15  Pt Name:    BRIAN BRODY                    Department: ER  MRN:        [id number]                      Room:       RD 03  Gender:     Male                          Technician: [id number]  :        [id number]                   Requested By:ER TRIAGE PROTOCOL  Order #:    [id number]                    Reading MD: ROSIO STRAUSS MD    Measurements  Intervals                                Axis  Rate:       60                           P:          0  VA:         194                          QRS:        -64  QRSD:       160                          T:          5  QT:         488  QTc:        488    Interpretive Statements  SINUS RHYTHM  RBBB AND LAFB  No previous ECG available for comparison    Electronically Signed On 1- 13:01:43 PST by ROSIO STRAUSS MD            1:47 PM I spoke with Dr. Ureña who will admit the patient to the CDU for further cardiac workup.  He still is having active pain his troponin is 0.05 which is nonspecific.  He will require serial troponins and possible stress test.    Ct head shows no bleed    FINAL IMPRESSION  1. Chest pain, unspecified type    2. Dyspnea, unspecified type    3. Permanent atrial fibrillation (HCC)            Electronically signed by: Rosio Strauss, 1/15/2019 1:47 PM

## 2019-01-16 ENCOUNTER — APPOINTMENT (OUTPATIENT)
Dept: RADIOLOGY | Facility: MEDICAL CENTER | Age: 79
DRG: 281 | End: 2019-01-16
Attending: HOSPITALIST
Payer: MEDICARE

## 2019-01-16 LAB
ANION GAP SERPL CALC-SCNC: 11 MMOL/L (ref 0–11.9)
BUN SERPL-MCNC: 19 MG/DL (ref 8–22)
CALCIUM SERPL-MCNC: 9.6 MG/DL (ref 8.5–10.5)
CHLORIDE SERPL-SCNC: 106 MMOL/L (ref 96–112)
CHOLEST SERPL-MCNC: 91 MG/DL (ref 100–199)
CO2 SERPL-SCNC: 21 MMOL/L (ref 20–33)
CREAT SERPL-MCNC: 1.26 MG/DL (ref 0.5–1.4)
EKG IMPRESSION: NORMAL
GLUCOSE SERPL-MCNC: 91 MG/DL (ref 65–99)
HDLC SERPL-MCNC: 24 MG/DL
LDLC SERPL CALC-MCNC: 45 MG/DL
MAGNESIUM SERPL-MCNC: 1.9 MG/DL (ref 1.5–2.5)
POTASSIUM SERPL-SCNC: 4 MMOL/L (ref 3.6–5.5)
SODIUM SERPL-SCNC: 138 MMOL/L (ref 135–145)
TRIGL SERPL-MCNC: 112 MG/DL (ref 0–149)
TROPONIN I SERPL-MCNC: 308.54 NG/ML (ref 0–0.04)
TROPONIN I SERPL-MCNC: 360.18 NG/ML (ref 0–0.04)

## 2019-01-16 PROCEDURE — A9270 NON-COVERED ITEM OR SERVICE: HCPCS | Performed by: STUDENT IN AN ORGANIZED HEALTH CARE EDUCATION/TRAINING PROGRAM

## 2019-01-16 PROCEDURE — B2151ZZ FLUOROSCOPY OF LEFT HEART USING LOW OSMOLAR CONTRAST: ICD-10-PCS | Performed by: INTERNAL MEDICINE

## 2019-01-16 PROCEDURE — 700102 HCHG RX REV CODE 250 W/ 637 OVERRIDE(OP): Performed by: HOSPITALIST

## 2019-01-16 PROCEDURE — 360979 HCHG DIAGNOSTIC CATH

## 2019-01-16 PROCEDURE — 93010 ELECTROCARDIOGRAM REPORT: CPT | Mod: 76 | Performed by: INTERNAL MEDICINE

## 2019-01-16 PROCEDURE — 4A023N7 MEASUREMENT OF CARDIAC SAMPLING AND PRESSURE, LEFT HEART, PERCUTANEOUS APPROACH: ICD-10-PCS | Performed by: INTERNAL MEDICINE

## 2019-01-16 PROCEDURE — 99153 MOD SED SAME PHYS/QHP EA: CPT

## 2019-01-16 PROCEDURE — 96376 TX/PRO/DX INJ SAME DRUG ADON: CPT

## 2019-01-16 PROCEDURE — 96374 THER/PROPH/DIAG INJ IV PUSH: CPT

## 2019-01-16 PROCEDURE — 92920 PRQ TRLUML C ANGIOP 1ART&/BR: CPT | Mod: LC,53

## 2019-01-16 PROCEDURE — A9270 NON-COVERED ITEM OR SERVICE: HCPCS | Performed by: HOSPITALIST

## 2019-01-16 PROCEDURE — 93005 ELECTROCARDIOGRAM TRACING: CPT | Performed by: INTERNAL MEDICINE

## 2019-01-16 PROCEDURE — 99233 SBSQ HOSP IP/OBS HIGH 50: CPT | Performed by: HOSPITALIST

## 2019-01-16 PROCEDURE — 700105 HCHG RX REV CODE 258: Performed by: INTERNAL MEDICINE

## 2019-01-16 PROCEDURE — A9270 NON-COVERED ITEM OR SERVICE: HCPCS | Performed by: INTERNAL MEDICINE

## 2019-01-16 PROCEDURE — 99291 CRITICAL CARE FIRST HOUR: CPT | Performed by: INTERNAL MEDICINE

## 2019-01-16 PROCEDURE — 99152 MOD SED SAME PHYS/QHP 5/>YRS: CPT

## 2019-01-16 PROCEDURE — 700101 HCHG RX REV CODE 250

## 2019-01-16 PROCEDURE — 80048 BASIC METABOLIC PNL TOTAL CA: CPT

## 2019-01-16 PROCEDURE — 93010 ELECTROCARDIOGRAM REPORT: CPT | Mod: 77 | Performed by: INTERNAL MEDICINE

## 2019-01-16 PROCEDURE — 93010 ELECTROCARDIOGRAM REPORT: CPT | Performed by: INTERNAL MEDICINE

## 2019-01-16 PROCEDURE — 700111 HCHG RX REV CODE 636 W/ 250 OVERRIDE (IP)

## 2019-01-16 PROCEDURE — 93458 L HRT ARTERY/VENTRICLE ANGIO: CPT

## 2019-01-16 PROCEDURE — 51798 US URINE CAPACITY MEASURE: CPT

## 2019-01-16 PROCEDURE — C1769 GUIDE WIRE: HCPCS

## 2019-01-16 PROCEDURE — 700101 HCHG RX REV CODE 250: Performed by: INTERNAL MEDICINE

## 2019-01-16 PROCEDURE — 92941 PRQ TRLML REVSC TOT OCCL AMI: CPT | Mod: LC,53

## 2019-01-16 PROCEDURE — 80061 LIPID PANEL: CPT

## 2019-01-16 PROCEDURE — 93458 L HRT ARTERY/VENTRICLE ANGIO: CPT | Mod: 26 | Performed by: INTERNAL MEDICINE

## 2019-01-16 PROCEDURE — B2111ZZ FLUOROSCOPY OF MULTIPLE CORONARY ARTERIES USING LOW OSMOLAR CONTRAST: ICD-10-PCS | Performed by: INTERNAL MEDICINE

## 2019-01-16 PROCEDURE — 700102 HCHG RX REV CODE 250 W/ 637 OVERRIDE(OP): Performed by: INTERNAL MEDICINE

## 2019-01-16 PROCEDURE — 83735 ASSAY OF MAGNESIUM: CPT

## 2019-01-16 PROCEDURE — 93005 ELECTROCARDIOGRAM TRACING: CPT | Performed by: HOSPITALIST

## 2019-01-16 PROCEDURE — 770020 HCHG ROOM/CARE - TELE (206)

## 2019-01-16 PROCEDURE — C1894 INTRO/SHEATH, NON-LASER: HCPCS

## 2019-01-16 PROCEDURE — 307093 HCHG TR BAND RADIAL

## 2019-01-16 PROCEDURE — 700102 HCHG RX REV CODE 250 W/ 637 OVERRIDE(OP): Performed by: STUDENT IN AN ORGANIZED HEALTH CARE EDUCATION/TRAINING PROGRAM

## 2019-01-16 PROCEDURE — 99232 SBSQ HOSP IP/OBS MODERATE 35: CPT | Mod: GC | Performed by: INTERNAL MEDICINE

## 2019-01-16 PROCEDURE — 84484 ASSAY OF TROPONIN QUANT: CPT

## 2019-01-16 PROCEDURE — 99152 MOD SED SAME PHYS/QHP 5/>YRS: CPT | Performed by: INTERNAL MEDICINE

## 2019-01-16 PROCEDURE — 700111 HCHG RX REV CODE 636 W/ 250 OVERRIDE (IP): Performed by: INTERNAL MEDICINE

## 2019-01-16 PROCEDURE — 304952 HCHG R 2 PADS

## 2019-01-16 PROCEDURE — C1887 CATHETER, GUIDING: HCPCS

## 2019-01-16 RX ORDER — OXYCODONE HYDROCHLORIDE 5 MG/1
5 TABLET ORAL EVERY 4 HOURS PRN
Status: DISCONTINUED | OUTPATIENT
Start: 2019-01-16 | End: 2019-01-18 | Stop reason: HOSPADM

## 2019-01-16 RX ORDER — MIDAZOLAM HYDROCHLORIDE 1 MG/ML
INJECTION INTRAMUSCULAR; INTRAVENOUS
Status: COMPLETED
Start: 2019-01-16 | End: 2019-01-16

## 2019-01-16 RX ORDER — MORPHINE SULFATE 4 MG/ML
2 INJECTION, SOLUTION INTRAMUSCULAR; INTRAVENOUS
Status: DISCONTINUED | OUTPATIENT
Start: 2019-01-16 | End: 2019-01-18

## 2019-01-16 RX ORDER — HEPARIN SODIUM,PORCINE 1000/ML
VIAL (ML) INJECTION
Status: COMPLETED
Start: 2019-01-16 | End: 2019-01-16

## 2019-01-16 RX ORDER — BIVALIRUDIN 250 MG/5ML
INJECTION, POWDER, LYOPHILIZED, FOR SOLUTION INTRAVENOUS
Status: COMPLETED
Start: 2019-01-16 | End: 2019-01-16

## 2019-01-16 RX ORDER — SODIUM CHLORIDE 9 MG/ML
INJECTION, SOLUTION INTRAVENOUS CONTINUOUS
Status: DISCONTINUED | OUTPATIENT
Start: 2019-01-16 | End: 2019-01-16

## 2019-01-16 RX ORDER — ATORVASTATIN CALCIUM 80 MG/1
80 TABLET, FILM COATED ORAL EVERY EVENING
Status: DISCONTINUED | OUTPATIENT
Start: 2019-01-16 | End: 2019-01-18 | Stop reason: HOSPADM

## 2019-01-16 RX ORDER — SODIUM CHLORIDE 9 MG/ML
INJECTION, SOLUTION INTRAVENOUS
Status: COMPLETED
Start: 2019-01-16 | End: 2019-01-16

## 2019-01-16 RX ORDER — VERAPAMIL HYDROCHLORIDE 2.5 MG/ML
INJECTION, SOLUTION INTRAVENOUS
Status: COMPLETED
Start: 2019-01-16 | End: 2019-01-16

## 2019-01-16 RX ORDER — MORPHINE SULFATE 4 MG/ML
INJECTION, SOLUTION INTRAMUSCULAR; INTRAVENOUS
Status: COMPLETED
Start: 2019-01-16 | End: 2019-01-16

## 2019-01-16 RX ORDER — LIDOCAINE 50 MG/G
OINTMENT TOPICAL
Status: COMPLETED | OUTPATIENT
Start: 2019-01-16 | End: 2019-01-16

## 2019-01-16 RX ORDER — MORPHINE SULFATE 4 MG/ML
4 INJECTION, SOLUTION INTRAMUSCULAR; INTRAVENOUS
Status: DISCONTINUED | OUTPATIENT
Start: 2019-01-16 | End: 2019-01-18

## 2019-01-16 RX ORDER — CARVEDILOL 3.12 MG/1
3.12 TABLET ORAL 2 TIMES DAILY WITH MEALS
Status: DISCONTINUED | OUTPATIENT
Start: 2019-01-16 | End: 2019-01-18 | Stop reason: HOSPADM

## 2019-01-16 RX ORDER — LIDOCAINE HYDROCHLORIDE 20 MG/ML
INJECTION, SOLUTION INFILTRATION; PERINEURAL
Status: COMPLETED
Start: 2019-01-16 | End: 2019-01-16

## 2019-01-16 RX ORDER — OXYCODONE HYDROCHLORIDE 10 MG/1
10 TABLET ORAL EVERY 4 HOURS PRN
Status: DISCONTINUED | OUTPATIENT
Start: 2019-01-16 | End: 2019-01-18 | Stop reason: HOSPADM

## 2019-01-16 RX ADMIN — LIDOCAINE: 50 OINTMENT TOPICAL at 04:49

## 2019-01-16 RX ADMIN — HEPARIN SODIUM: 1000 INJECTION, SOLUTION INTRAVENOUS; SUBCUTANEOUS at 02:21

## 2019-01-16 RX ADMIN — NITROGLYCERIN 10 ML: 20 INJECTION INTRAVENOUS at 02:21

## 2019-01-16 RX ADMIN — CARVEDILOL 3.12 MG: 3.12 TABLET, FILM COATED ORAL at 10:26

## 2019-01-16 RX ADMIN — FENTANYL CITRATE 100 MCG: 50 INJECTION, SOLUTION INTRAMUSCULAR; INTRAVENOUS at 02:23

## 2019-01-16 RX ADMIN — VERAPAMIL HYDROCHLORIDE 2.5 MG: 2.5 INJECTION, SOLUTION INTRAVENOUS at 02:21

## 2019-01-16 RX ADMIN — QUETIAPINE FUMARATE 100 MG: 100 TABLET ORAL at 21:48

## 2019-01-16 RX ADMIN — DIVALPROEX SODIUM 250 MG: 250 TABLET, DELAYED RELEASE ORAL at 07:41

## 2019-01-16 RX ADMIN — OXYCODONE HYDROCHLORIDE 10 MG: 10 TABLET ORAL at 12:41

## 2019-01-16 RX ADMIN — LIDOCAINE HYDROCHLORIDE: 20 INJECTION, SOLUTION INFILTRATION; PERINEURAL at 02:21

## 2019-01-16 RX ADMIN — SODIUM CHLORIDE: 9 INJECTION, SOLUTION INTRAVENOUS at 04:26

## 2019-01-16 RX ADMIN — MORPHINE SULFATE 4 MG: 4 INJECTION INTRAVENOUS at 01:40

## 2019-01-16 RX ADMIN — ALPRAZOLAM 0.25 MG: 0.25 TABLET ORAL at 05:34

## 2019-01-16 RX ADMIN — MIDAZOLAM HYDROCHLORIDE 2 MG: 1 INJECTION, SOLUTION INTRAMUSCULAR; INTRAVENOUS at 03:00

## 2019-01-16 RX ADMIN — ASPIRIN 81 MG: 81 TABLET, COATED ORAL at 10:26

## 2019-01-16 RX ADMIN — LEVOTHYROXINE SODIUM 25 MCG: 50 TABLET ORAL at 04:48

## 2019-01-16 RX ADMIN — ATROPINE SULFATE 0.5 MG: 0.1 INJECTION PARENTERAL at 02:38

## 2019-01-16 RX ADMIN — MIDAZOLAM HYDROCHLORIDE 2 MG: 1 INJECTION, SOLUTION INTRAMUSCULAR; INTRAVENOUS at 02:22

## 2019-01-16 RX ADMIN — HEPARIN SODIUM: 200 INJECTION, SOLUTION INTRAVENOUS at 02:15

## 2019-01-16 RX ADMIN — DONEPEZIL HYDROCHLORIDE 5 MG: 5 TABLET, FILM COATED ORAL at 21:48

## 2019-01-16 RX ADMIN — MORPHINE SULFATE 4 MG: 4 INJECTION INTRAVENOUS at 10:35

## 2019-01-16 RX ADMIN — BIVALIRUDIN 250 MG: 250 INJECTION, POWDER, LYOPHILIZED, FOR SOLUTION INTRAVENOUS at 02:54

## 2019-01-16 ASSESSMENT — PAIN SCALES - GENERAL
PAINLEVEL_OUTOF10: 3
PAINLEVEL_OUTOF10: 0
PAINLEVEL_OUTOF10: 3
PAINLEVEL_OUTOF10: 6
PAINLEVEL_OUTOF10: 3
PAINLEVEL_OUTOF10: 2
PAINLEVEL_OUTOF10: 10
PAINLEVEL_OUTOF10: 6
PAINLEVEL_OUTOF10: 7

## 2019-01-16 NOTE — PROGRESS NOTES
Notified by lab of significant jump in troponin marker 307.36. Dr. Reyes paged and updated. Orders for a repeat STAT troponin placed. VSS stable. Will continue to monitor.

## 2019-01-16 NOTE — PROGRESS NOTES
Pt c/o chest pain 6/10 claiming radiating to left neck. VSS. A&Ox4. No SOB, diaphoresis or other s/s of myocardial infarct noted tat this time. Orders placed for STAT EKG and Troponin. EKG at bedside pt uncooperative refusing to hold still. Stating he is having an anxiety attack. Therapeutic communication skills used. Pt restless. EKG complete with limited pt cooperation. No presentation of ST elevation or depression. Atrial fibrillation with a bundle branch block.     2240 pt ambulated around unit accompanied by nurse and tech with walker. Pt back to bed. Bed alarm placed. Patient refusing bed alarm.

## 2019-01-16 NOTE — PROGRESS NOTES
Pt c/o anxiety restlessness. Dr. Reyes paged. Orders for Xanax 0.25mg PO TID PRN anxiety received. Will continue to monitor.

## 2019-01-16 NOTE — PROGRESS NOTES
Bedside report received, pt care assumed, tele box on. Pt aaox4, no signs of distress noted at this time. POC discussed with pt and verbalizes no questions. Pt denies any additional needs at this time. Bed in lowest position, pt educated on fall risk and verbalized understanding, call light within reach.

## 2019-01-16 NOTE — PROGRESS NOTES
Per patient, ok to update daughter in law, Laney.     Dr. Rivera will call Laney to update on patient status and to discuss code status.

## 2019-01-16 NOTE — PROGRESS NOTES
MD Warren arrived at bedside. Called Rapid response. Medication given see mar. Pt prepped for cath lab and sent down accompanied by ACLS RN.

## 2019-01-16 NOTE — CONSULTS
Cardiology Consult Note:    Gee Warren  Date & Time note created:    1/16/2019   1:30 AM     Referring MD:  Dr. Ureña    Patient ID:   Name:             Paulie Melgar     YOB: 1940  Age:                 78 y.o.  male   MRN:               [id number]                                                             Reason for Consult:      STEMI    History of Present Illness:    79 yo M with PMH of a-fib, HTN presents tonight with chest pain described as a sharp, pressure like sensation in his chest rated a 5-6/10 starting yesterday.  His admitting ECG showed a posterior infarct pattern with a follow up ECG showing an evolving ECG pattern of a posterior STEMI.  His first two troponins were negative however his third was positive at 307.  He continues to have chest pain.  A code STEMI was activated and repeat ECG was obtained.    Review of Systems:      Constitutional: Denies fevers, Denies weight changes  Eyes: Denies changes in vision, no eye pain  Ears/Nose/Throat/Mouth: Denies nasal congestion or sore throat   Cardiovascular: + chest pain, no palpitations   Respiratory: no shortness of breath , Denies cough  Gastrointestinal/Hepatic: Denies abdominal pain, nausea, vomiting, diarrhea, constipation or GI bleeding   Genitourinary: Denies dysuria or frequency  Musculoskeletal/Rheum: Denies  joint pain and swelling, no edema  Skin: Denies rash  Neurological: Denies headache, confusion, memory loss or focal weakness/parasthesias  Psychiatric: denies mood disorder   Endocrine: Sheron thyroid problems  Heme/Oncology/Lymph Nodes: Denies enlarged lymph nodes, denies brusing or known bleeding disorder  All other systems were reviewed and are negative (AMA/CMS criteria)                Past Medical History:   Past Medical History:   Diagnosis Date   • Cancer (HCC) 1990    lymphoma and melanoma   • GERD (gastroesophageal reflux disease)    • Hodgkins disease (HCC) 2/2/2010   • Hypertension    • Hypothyroid  2/2/2010   • Insomnia 3/22/2010   • Melanoma (HCC) 2/2/2010   • Melanoma in situ of back (HCC) 2/2/2010   • Neuropathy (HCC) 2/11/2010   • Syncopal episodes 2/2/2010   • Thyroid disease     hypothyroidism     Active Hospital Problems    Diagnosis   • Paroxysmal atrial fibrillation (HCC) [I48.0]   •   Rule out thoracic or abdominal aortic aneurysm (AAA) without rupture (HCC) [I71.4]   • Essential hypertension [I10]   • Precordial pain [R07.2]   • Alzheimer's disease [G30.9, F02.80]   • Recurrent major depressive disorder, in full remission (HCC) [F33.42]   • Hypothyroid [E03.9]   • GERD (gastroesophageal reflux disease) [K21.9]       Past Surgical History:  Past Surgical History:   Procedure Laterality Date   • ABDOMINAL EXPLORATION      for hodgkins lymphoma   • APPENDECTOMY         Hospital Medications:  Current Facility-Administered Medications   Medication Dose   • morphine (pf) 4 mg/ml injection 2 mg  2 mg    Or   • morphine (pf) 4 mg/ml injection 4 mg  4 mg   • polyethylene glycol/lytes (MIRALAX) PACKET 1 Packet  1 Packet    And   • magnesium hydroxide (MILK OF MAGNESIA) suspension 30 mL  30 mL    And   • bisacodyl (DULCOLAX) suppository 10 mg  10 mg   • [START ON 1/17/2019] cloNIDine (CATAPRES) 0.1 MG/24HR 1 Patch  1 Patch   • digoxin (LANOXIN) tablet 250 mcg  250 mcg   • divalproex (DEPAKOTE) delayed-release tablet 250 mg  250 mg   • donepezil (ARICEPT) tablet 5 mg  5 mg   • escitalopram (LEXAPRO) tablet 20 mg  20 mg   • levothyroxine (SYNTHROID) tablet 25 mcg  25 mcg   • omeprazole (PRILOSEC) capsule 20 mg  20 mg   • QUEtiapine (SEROQUEL) tablet 100 mg  100 mg   • rivaroxaban (XARELTO) tablet 20 mg  20 mg   • Influenza Vaccine High-Dose pf injection 0.5 mL  0.5 mL   • loperamide (IMODIUM) capsule 2 mg  2 mg   • ALPRAZolam (XANAX) tablet 0.25 mg  0.25 mg         Current Outpatient Medications:  Prescriptions Prior to Admission   Medication Sig Dispense Refill Last Dose   • levothyroxine (SYNTHROID) 25 MCG Tab  Take 25 mcg by mouth Every morning on an empty stomach.   1/15/2019 at 0800   • digoxin (LANOXIN) 250 MCG Tab Take 250 mcg by mouth every 48 hours.   1/14/2019 at 0800   • escitalopram (LEXAPRO) 20 MG tablet Take 20 mg by mouth every day.   1/15/2019 at 0800   • omeprazole (PRILOSEC) 20 MG delayed-release capsule Take 20 mg by mouth every day.   1/8/2019 at 2000   • Capsaicin (CAPZASIN-HP) 0.1 % Cream 1 Application by Apply externally route 4 times a day as needed.   1/15/2019 at 1200   • cloNIDine (CATAPRES) 0.1 MG/24HR PATCH WEEKLY Apply 1 Patch to skin as directed every 7 days.   1/10/19 at 0800   • divalproex (DEPAKOTE) 250 MG Tablet Delayed Response Take 250 mg by mouth every day.   1/14/2019 at 1600   • rivaroxaban (XARELTO) 20 MG Tab tablet Take 20 mg by mouth with dinner.   1/14/2019 at 1800   • QUEtiapine (SEROQUEL) 100 MG Tab Take 100 mg by mouth every bedtime.   1/8/2019 at 2000   • donepezil (ARICEPT) 5 MG Tab Take 5 mg by mouth every evening.   1/8/2019 at 2000       Medication Allergy:  Allergies   Allergen Reactions   • Pcn [Penicillins] Anaphylaxis       Family History:  Family History   Problem Relation Age of Onset   • Heart Disease Mother        Social History:  Social History     Social History   • Marital status:      Spouse name: N/A   • Number of children: N/A   • Years of education: N/A     Occupational History   • Not on file.     Social History Main Topics   • Smoking status: Former Smoker     Quit date: 2/2/2000   • Smokeless tobacco: Never Used      Comment: smoked for 35 yrs before he quit in 2000   • Alcohol use No   • Drug use: No   • Sexual activity: Not on file     Other Topics Concern   • Not on file     Social History Narrative   • No narrative on file         Physical Exam:  Vitals/ General Appearance:   Weight/BMI: Body mass index is 28.45 kg/m².  Blood pressure 148/65, pulse 86, temperature 36.7 °C (98 °F), temperature source Temporal, resp. rate (!) 26, height 1.88 m (6'  "2\"), weight 100.5 kg (221 lb 9 oz), SpO2 96 %.  Vitals:    01/15/19 1826 01/15/19 2000 01/15/19 2208 01/16/19 0000   BP: 116/61 142/78 134/78 148/65   Pulse: 67 76 98 86   Resp: 18 18 (!) 22 (!) 26   Temp: 36.4 °C (97.5 °F) 36.7 °C (98.1 °F) 36.1 °C (97 °F) 36.7 °C (98 °F)   TempSrc: Temporal Temporal Temporal Temporal   SpO2: 94% 95% 95% 96%   Weight:  100.5 kg (221 lb 9 oz)     Height:         Oxygen Therapy:  Pulse Oximetry: 96 %, O2 (LPM): 0, O2 Delivery: None (Room Air)    Constitutional:   Well developed, Well nourished, No acute distress  HENMT:  Normocephalic, Atraumatic, Oropharynx moist mucous membranes, No oral exudates, Nose normal.  No thyromegaly.  Eyes:  EOMI, Conjunctiva normal, No discharge.  Neck:  Normal range of motion, No cervical tenderness,  no JVD.  Cardiovascular:  Normal heart rate, Normal rhythm, No murmurs, No rubs, No gallops.   Extremitites with intact distal pulses, no cyanosis, or edema.  Lungs:  Normal breath sounds, breath sounds clear to auscultation bilaterally,  no crackles, no wheezing.   Abdomen: Bowel sounds normal, Soft, No tenderness, No guarding, No rebound, No masses, No hepatosplenomegaly.  Skin: Warm, Dry, No erythema, No rash, no induration.  Neurologic: Alert & oriented x 3, No focal deficits noted, cranial nerves II through X are grossly intact.  Psychiatric: Affect normal, Judgment normal, Mood normal.      MDM (Data Review):     Records reviewed and summarized in current documentation    Lab Data Review:  Recent Results (from the past 24 hour(s))   Troponin    Collection Time: 01/15/19 12:45 PM   Result Value Ref Range    Troponin I 0.05 (H) 0.00 - 0.04 ng/mL   Btype Natriuretic Peptide    Collection Time: 01/15/19 12:45 PM   Result Value Ref Range    B Natriuretic Peptide 163 (H) 0 - 100 pg/mL   CBC with Differential    Collection Time: 01/15/19 12:45 PM   Result Value Ref Range    WBC 9.5 4.8 - 10.8 K/uL    RBC 4.65 (L) 4.70 - 6.10 M/uL    Hemoglobin 13.3 (L) 14.0 " - 18.0 g/dL    Hematocrit 40.7 (L) 42.0 - 52.0 %    MCV 87.5 81.4 - 97.8 fL    MCH 28.6 27.0 - 33.0 pg    MCHC 32.7 (L) 33.7 - 35.3 g/dL    RDW 51.6 (H) 35.9 - 50.0 fL    Platelet Count 360 164 - 446 K/uL    MPV 9.7 9.0 - 12.9 fL    Neutrophils-Polys 79.90 (H) 44.00 - 72.00 %    Lymphocytes 12.80 (L) 22.00 - 41.00 %    Monocytes 5.10 0.00 - 13.40 %    Eosinophils 1.60 0.00 - 6.90 %    Basophils 0.10 0.00 - 1.80 %    Immature Granulocytes 0.50 0.00 - 0.90 %    Nucleated RBC 0.00 /100 WBC    Neutrophils (Absolute) 7.60 (H) 1.82 - 7.42 K/uL    Lymphs (Absolute) 1.22 1.00 - 4.80 K/uL    Monos (Absolute) 0.49 0.00 - 0.85 K/uL    Eos (Absolute) 0.15 0.00 - 0.51 K/uL    Baso (Absolute) 0.01 0.00 - 0.12 K/uL    Immature Granulocytes (abs) 0.05 0.00 - 0.11 K/uL    NRBC (Absolute) 0.00 K/uL   Complete Metabolic Panel (CMP)    Collection Time: 01/15/19 12:45 PM   Result Value Ref Range    Sodium 138 135 - 145 mmol/L    Potassium 3.7 3.6 - 5.5 mmol/L    Chloride 104 96 - 112 mmol/L    Co2 24 20 - 33 mmol/L    Anion Gap 10.0 0.0 - 11.9    Glucose 117 (H) 65 - 99 mg/dL    Bun 18 8 - 22 mg/dL    Creatinine 1.26 0.50 - 1.40 mg/dL    Calcium 8.9 8.5 - 10.5 mg/dL    AST(SGOT) 73 (H) 12 - 45 U/L    ALT(SGPT) 12 2 - 50 U/L    Alkaline Phosphatase 74 30 - 99 U/L    Total Bilirubin 0.7 0.1 - 1.5 mg/dL    Albumin 3.6 3.2 - 4.9 g/dL    Total Protein 6.3 6.0 - 8.2 g/dL    Globulin 2.7 1.9 - 3.5 g/dL    A-G Ratio 1.3 g/dL   Prothrombin Time    Collection Time: 01/15/19 12:45 PM   Result Value Ref Range    PT 13.6 12.0 - 14.6 sec    INR 1.03 0.87 - 1.13   APTT    Collection Time: 01/15/19 12:45 PM   Result Value Ref Range    APTT 26.5 24.7 - 36.0 sec   Lipase    Collection Time: 01/15/19 12:45 PM   Result Value Ref Range    Lipase 27 11 - 82 U/L   ESTIMATED GFR    Collection Time: 01/15/19 12:45 PM   Result Value Ref Range    GFR If African American >60 >60 mL/min/1.73 m 2    GFR If Non African American 55 (A) >60 mL/min/1.73 m 2   EKG     Collection Time: 01/15/19 12:45 PM   Result Value Ref Range    Report       [facility] Emergency Dept[facility]    Test Date:  2019-01-15  Pt Name:    BRIAN BRODY                    Department: ER  MRN:        [id number]                      Room:       RD 03  Gender:     Male                         Technician: [id number]  :        1940                   Requested By:ER TRIAGE PROTOCOL  Order #:    [id number]                    Reading MD: ROSIO STRAUSS MD    Measurements  Intervals                                Axis  Rate:       60                           P:          0  OK:         194                          QRS:        -64  QRSD:       160                          T:          5  QT:         488  QTc:        488    Interpretive Statements  SINUS RHYTHM  RBBB AND LAFB  No previous ECG available for comparison    Electronically Signed On 1- 13:01:43 PST by ROSIO STRAUSS MD     TROPONIN    Collection Time: 01/15/19 10:12 PM   Result Value Ref Range    Troponin I 307.36 (H) 0.00 - 0.04 ng/mL   EKG    Collection Time: 01/15/19 10:16 PM   Result Value Ref Range    Report       [facility]    Test Date:  2019-01-15  Pt Name:    BRIAN BRODY                    Department: 171  MRN:        [id number]                      Room:       T703  Gender:     Male                         Technician: [id number]  :        1940                   Requested By:DORENE PATEL  Order #:    [id number]                    Reading MD: Gee Warren MD    Measurements  Intervals                                Axis  Rate:       101                          P:  OK:                                      QRS:        -70  QRSD:       142                          T:          59  QT:         384  QTc:        498    Interpretive Statements  ATRIAL FIBRILLATION, V-RATE    RBBB AND LAFB  BASELINE WANDER IN LEAD(S) III,V4  Compared to ECG 01/15/2019 12:45:53  Sinus rhythm no longer present    Electronically Signed On  1- 0:18:42 PST by Gee Warren MD         Imaging/Procedures Review:    Chest Xray:  Reviewed    EKG:   Personally reviewed by myself showing ST depression in V1-V3 concerning for a posterior STEMI    ECHO:  N/A    MDM (Assessment and Plan):     Active Hospital Problems    Diagnosis   • Paroxysmal atrial fibrillation (HCC) [I48.0]   •   Rule out thoracic or abdominal aortic aneurysm (AAA) without rupture (HCC) [I71.4]   • Essential hypertension [I10]   • Precordial pain [R07.2]   • Alzheimer's disease [G30.9, F02.80]   • Recurrent major depressive disorder, in full remission (HCC) [F33.42]   • Hypothyroid [E03.9]   • GERD (gastroesophageal reflux disease) [K21.9]     77 yo M with HTN and a pos troponin concerning for an evolving posterior STEMI.  He will be taken to the cardiac cath lab for emergency evaluation.  Please see addendum for further recommendations.    Thank for you allowing me to take part in your patient's care, please call should you have any questions or would like to discuss this patient.    Addendum:  Patient underwent coronary angiogram with no clear culprit noted.  The LAD appeared  with ECG changes posteriorly.  The RCA was clean and the LCx had diffuse non obstructive disease with JELLY-3 flow.  The patient became very combative during the case and was thrashing and trying to get up.  He could not follow commands and kept refusing the procedure saying he wanted to go home.  I discussed the case with Dr. Miranda who does feel it is safe to proceed with an intervention without intubation.  He cannot be further sedated due to low O2 sats.  Given his risk of intubation and his wishes, he will be taken to the CCU for follow up care.      This patient is critically ill with a life threatening illness as noted above requiring my direct presence, involvement and maximal preparedness. I have personally spend 60 minutes providing critical care to this patient. Time spend on critical care  excludes time spend on procedures.

## 2019-01-16 NOTE — PROGRESS NOTES
Pt. Does not want family called at this time.  He states maybe later.  He continue to pull on his mckenzie catheter after several times explaining the reasoning of why.  Also, continue to remind him not to use the right wrist where the guide wire when through, but continues to push and use that hand.

## 2019-01-16 NOTE — CODE DOCUMENTATION
Pt complaining of chest pain. Dr. Reyes was notified prior to rapid response. Second troponin ordered, came back >50. Dr Arias, Cardialogist was notified for Code STEMI. Pt to be taken to cath lab STAT.

## 2019-01-16 NOTE — PROGRESS NOTES
2 RN skin check complete with CLYDE Reynolds:    Small abrasion on right anterior head. Moisture and redness noted in the pannus. Small scab noted on right ankle. No open wounds or sores noted.

## 2019-01-16 NOTE — CARE PLAN
Problem: Communication  Goal: The ability to communicate needs accurately and effectively will improve  Outcome: PROGRESSING AS EXPECTED    Intervention: [place] patient and significant other/support system to call light to alert staff of needs   01/15/19 1040   OTHER   Oriented to: All of the Following : Location of Bathroom, Visiting Policy, Unit Routine, Call Light and Bedside Controls, Bedside Rail Policy, Smoking Policy, Rights and Responsibilities, Bedside Report, and Patient Education Notebook         Problem: Safety  Goal: Will remain free from falls  Outcome: PROGRESSING AS EXPECTED  Fall risk assessed, fall precautions in place, pt verbalizes understanding of fall risk.

## 2019-01-16 NOTE — ASSESSMENT & PLAN NOTE
NSTEMI  Total occlusion LAD  S/p cath with no intervention; recomending medical management  ASA  High dose statin  Coreg  Dig  Consider ACEI

## 2019-01-16 NOTE — PROGRESS NOTES
Dr. Reyes  notified of critical lab result at 0111  Critical lab result read back by Dr. Reyes. Orders received see mar

## 2019-01-16 NOTE — CARE PLAN
Problem: Communication  Goal: The ability to communicate needs accurately and effectively will improve  Outcome: PROGRESSING SLOWER THAN EXPECTED  Pt does not call appropriately, sitter at bedside.     Problem: Pain Management  Goal: Pain level will decrease to patient's comfort goal  Outcome: PROGRESSING AS EXPECTED  New orders for pain control

## 2019-01-16 NOTE — PROGRESS NOTES
Chemistry  from was called with critical result of > 50 trop at 0109. Critical lab result read back to [facility] .   Dr. Reyes  notified of critical lab result at 0111  Critical lab result read back by Dr. Reyes.

## 2019-01-16 NOTE — H&P
Hospital Medicine History & Physical Note    Date of Service  1/15/2019    Primary Care Physician  Usama Pedersen M.D.    Consultants  None    Code Status  Full code    Chief Complaint  Chest pain    History of Presenting Illness  78 y.o. male who presented 1/15/2019 with chest pain, it is a sharp pain, intensity 6 out of 10,  present for the last 2 days.  He states that it is present in the center of his chest and radiates down towards his center of his abdomen.  No fever chills or cough.  No nausea or vomiting or palpitation.  Nothing makes it better or worse.  Patient has a history of atrial fibrillation .  He also frequent falls out of the bed.  He did fall out of bed yesterday.    Review of Systems  Review of Systems   Constitutional: Negative.    HENT: Negative.    Respiratory: Negative.    Cardiovascular: Positive for chest pain.   Gastrointestinal: Negative.    Genitourinary: Negative.    Musculoskeletal: Negative.    Neurological: Negative.    Psychiatric/Behavioral: Negative.    All other systems reviewed and are negative.      Past Medical History   has a past medical history of Cancer ([facility]) (1990); GERD (gastroesophageal reflux disease); Hodgkins disease ([facility]) (2/2/2010); Hypertension; Hypothyroid (2/2/2010); Insomnia (3/22/2010); Melanoma ([facility]) (2/2/2010); Melanoma in situ of back ([facility]) (2/2/2010); Neuropathy ([facility]) (2/11/2010); Syncopal episodes (2/2/2010); and Thyroid disease.    Surgical History   has a past surgical history that includes appendectomy and abdominal exploration.     Family History  family history includes Heart Disease in his mother.     Social History   reports that he quit smoking about 18 years ago. He has never used smokeless tobacco. He reports that he does not drink alcohol or use drugs.    Allergies  Allergies   Allergen Reactions   • Pcn [Penicillins] Anaphylaxis       Medications  Prior to Admission Medications   Prescriptions Last Dose Informant Patient Reported? Taking?    Capsaicin (CAPZASIN-HP) 0.1 % Cream 1/15/2019 at 1200 MAR from Other Facility Yes Yes   Si Application by Apply externally route 4 times a day as needed.   QUEtiapine (SEROQUEL) 100 MG Tab 2019 at 2000 MAR from Other Facility Yes No   Sig: Take 100 mg by mouth every bedtime.   cloNIDine (CATAPRES) 0.1 MG/24HR PATCH WEEKLY 1/10/2019 at 0800 MAR from Other Facility Yes No   Sig: Apply 1 Patch to skin as directed every 7 days.   digoxin (LANOXIN) 250 MCG Tab 2019 at 0800 MAR from Other Facility Yes Yes   Sig: Take 250 mcg by mouth every 48 hours.   divalproex (DEPAKOTE) 250 MG Tablet Delayed Response 2019 at 1600 MAR from Other Facility Yes No   Sig: Take 250 mg by mouth every day.   donepezil (ARICEPT) 5 MG Tab 2019 at 2000 MAR from Other Facility Yes No   Sig: Take 5 mg by mouth every evening.   escitalopram (LEXAPRO) 20 MG tablet 1/15/2019 at 0800 MAR from Other Facility Yes Yes   Sig: Take 20 mg by mouth every day.   levothyroxine (SYNTHROID) 25 MCG Tab 1/15/2019 at 0800 MAR from Other Facility Yes Yes   Sig: Take 25 mcg by mouth Every morning on an empty stomach.   omeprazole (PRILOSEC) 20 MG delayed-release capsule 2019 at 2000 MAR from Other Facility Yes Yes   Sig: Take 20 mg by mouth every day.   rivaroxaban (XARELTO) 20 MG Tab tablet 2019 at 1800 MAR from Other Facility Yes No   Sig: Take 20 mg by mouth with dinner.      Facility-Administered Medications: None       Physical Exam  Temp:  [36.3 °C (97.4 °F)] 36.3 °C (97.4 °F)  Pulse:  [65-77] 70  Resp:  [17-19] 17  BP: (114)/(63) 114/63  SpO2:  [94 %-100 %] 94 %    Physical Exam   Constitutional: No distress.   Small scab on right side of his forehead   HENT:   Head: Normocephalic and atraumatic.   Mouth/Throat: No oropharyngeal exudate.   Eyes: Pupils are equal, round, and reactive to light. EOM are normal. Left eye exhibits no discharge. No scleral icterus.   Neck: No JVD present. No tracheal deviation present. No thyromegaly  present.   Cardiovascular: Normal rate, regular rhythm and intact distal pulses.  Exam reveals no gallop and no friction rub.    No murmur heard.  Pulmonary/Chest: No respiratory distress. He has no wheezes. He has no rales. He exhibits no tenderness.   Abdominal: He exhibits no distension. There is no tenderness. There is no rebound.   Musculoskeletal: Normal range of motion. He exhibits deformity. He exhibits no edema.   Neurological: He is alert. No cranial nerve deficit. Coordination normal.   Skin: Skin is warm and dry. No erythema.   Psychiatric: He has a normal mood and affect.       Laboratory:  Recent Labs      01/15/19   1245   WBC  9.5   RBC  4.65*   HEMOGLOBIN  13.3*   HEMATOCRIT  40.7*   MCV  87.5   MCH  28.6   MCHC  32.7*   RDW  51.6*   PLATELETCT  360   MPV  9.7     Recent Labs      01/15/19   1245   SODIUM  138   POTASSIUM  3.7   CHLORIDE  104   CO2  24   GLUCOSE  117*   BUN  18   CREATININE  1.26   CALCIUM  8.9     Recent Labs      01/15/19   1245   ALTSGPT  12   ASTSGOT  73*   ALKPHOSPHAT  74   TBILIRUBIN  0.7   LIPASE  27   GLUCOSE  117*     Recent Labs      01/15/19   1245   APTT  26.5   INR  1.03     Recent Labs      01/15/19   1245   BNPBTYPENAT  163*         Recent Labs      01/15/19   1245   TROPONINI  0.05*       Urinalysis:    No results found     Imaging:  CT-HEAD W/O   Final Result      No acute intracranial abnormality is identified.      There are periventricular and subcortical white matter changes present.  This finding is nonspecific and could be from previous small vessel ischemia, demyelination, or gliosis.      Mild atrophy.      Mild mucosal thickening in the right maxillary sinus.      DX-CHEST-LIMITED (1 VIEW)   Final Result      No acute cardiopulmonary process is identified.      Atherosclerotic plaque.            CT-CTA COMPLETE THORACOABDOMINAL AORTA    (Results Pending)       EKG reviewed by me normal sinus rhythm, rate of 60, right bundle branch block  Assessment/Plan:  I  anticipate this patient will require at least two midnights for appropriate medical management, necessitating inpatient admission.    * Precordial pain- (present on admission)   Assessment & Plan    Telemetry    Check troponins x3    Check stress test     Recurrent major depressive disorder, in full remission (HCC)- (present on admission)   Assessment & Plan    Continue Seroquel and Lexapro     Alzheimer's disease- (present on admission)   Assessment & Plan    Continue Aricept     Essential hypertension- (present on admission)   Assessment & Plan    Continue clonidine       Rule out thoracic or abdominal aortic aneurysm (AAA) without rupture (HCC)- (present on admission)   Assessment & Plan    CT abdomen,  CTA of the chest  to look at the aorta in the chest and abdomen     Paroxysmal atrial fibrillation (HCC)- (present on admission)   Assessment & Plan    Continue digoxin and Xarelto     GERD (gastroesophageal reflux disease)- (present on admission)   Assessment & Plan    PPI     Hypothyroid- (present on admission)   Assessment & Plan    Continue Synthroid         VTE prophylaxis: scd

## 2019-01-16 NOTE — CARE PLAN
Problem: Safety  Goal: Will remain free from falls    Intervention: Assess risk factors for falls  Bed in locked and in low position.  Call light within reach.  CNA sitter at bedside as patient is constantly up and down.       Problem: Pain Management  Goal: Pain level will decrease to patient's comfort goal    Intervention: Follow pain managment plan developed in collaboration with patient and Interdisciplinary Team  Will continue to monitor pain throughout he rest of his shift.

## 2019-01-16 NOTE — CONSULTS
Cardiology Progress Note:      Date & Time note created:    1/16/2019   7:44 AM     Referring MD:  Dr. Ureña    Patient ID:   Name:             Paulie Melgar     YOB: 1940  Age:                 78 y.o.  male   MRN:               [id number]                                                             Reason for Consult:      STEMI    History of Present Illness:    79 yo M with PMH of a-fib, HTN presents tonight with chest pain described as a sharp, pressure like sensation in his chest rated a 5-6/10 starting yesterday.  His admitting ECG showed a posterior infarct pattern with a follow up ECG showing an evolving ECG pattern of a posterior STEMI.  His first two troponins were negative however his third was positive at 307.  He continues to have chest pain.  A code STEMI was activated and repeat ECG was obtained. EKG     Event Interval:      1/16/2019 Patient reporting 6/10 shoulder pain. Denies dyspnea, dizziness or palpitations. Patient still upset over anxiety during cath lab procedure which was held as patient was becoming combative. He was noted for a total (likely chronic) LAD occlusion with slight blood surplice from collateral RCA branch. Nondominant LCA with 60% stenosis and RCA dominant. Trops peaked at 360.18. Started medical therapy with ASA, BB therapy, and high does statin. Morphine for pain. Pending ECHO.        Past Medical History:   Past Medical History:   Diagnosis Date   • Cancer (HCC) 1990    lymphoma and melanoma   • GERD (gastroesophageal reflux disease)    • Hodgkins disease (HCC) 2/2/2010   • Hypertension    • Hypothyroid 2/2/2010   • Insomnia 3/22/2010   • Melanoma (HCC) 2/2/2010   • Melanoma in situ of back (HCC) 2/2/2010   • Neuropathy (HCC) 2/11/2010   • Syncopal episodes 2/2/2010   • Thyroid disease     hypothyroidism     Active Hospital Problems    Diagnosis   • Paroxysmal atrial fibrillation (HCC) [I48.0]   •   Rule out thoracic or abdominal aortic aneurysm (AAA) without  rupture (HCC) [I71.4]   • Essential hypertension [I10]   • Precordial pain [R07.2]   • Alzheimer's disease [G30.9, F02.80]   • Recurrent major depressive disorder, in full remission (HCC) [F33.42]   • Hypothyroid [E03.9]   • GERD (gastroesophageal reflux disease) [K21.9]       Past Surgical History:  Past Surgical History:   Procedure Laterality Date   • ABDOMINAL EXPLORATION      for hodgkins lymphoma   • APPENDECTOMY         Hospital Medications:  Current Facility-Administered Medications   Medication Dose   • morphine (pf) 4 mg/ml injection 2 mg  2 mg    Or   • morphine (pf) 4 mg/ml injection 4 mg  4 mg   • NS infusion     • polyethylene glycol/lytes (MIRALAX) PACKET 1 Packet  1 Packet    And   • magnesium hydroxide (MILK OF MAGNESIA) suspension 30 mL  30 mL    And   • bisacodyl (DULCOLAX) suppository 10 mg  10 mg   • [START ON 1/17/2019] cloNIDine (CATAPRES) 0.1 MG/24HR 1 Patch  1 Patch   • digoxin (LANOXIN) tablet 250 mcg  250 mcg   • divalproex (DEPAKOTE) delayed-release tablet 250 mg  250 mg   • donepezil (ARICEPT) tablet 5 mg  5 mg   • escitalopram (LEXAPRO) tablet 20 mg  20 mg   • levothyroxine (SYNTHROID) tablet 25 mcg  25 mcg   • omeprazole (PRILOSEC) capsule 20 mg  20 mg   • QUEtiapine (SEROQUEL) tablet 100 mg  100 mg   • rivaroxaban (XARELTO) tablet 20 mg  20 mg   • Influenza Vaccine High-Dose pf injection 0.5 mL  0.5 mL   • loperamide (IMODIUM) capsule 2 mg  2 mg   • ALPRAZolam (XANAX) tablet 0.25 mg  0.25 mg         Current Outpatient Medications:  Prescriptions Prior to Admission   Medication Sig Dispense Refill Last Dose   • levothyroxine (SYNTHROID) 25 MCG Tab Take 25 mcg by mouth Every morning on an empty stomach.   1/15/2019 at 0800   • digoxin (LANOXIN) 250 MCG Tab Take 250 mcg by mouth every 48 hours.   1/14/2019 at 0800   • escitalopram (LEXAPRO) 20 MG tablet Take 20 mg by mouth every day.   1/15/2019 at 0800   • omeprazole (PRILOSEC) 20 MG delayed-release capsule Take 20 mg by mouth every  "day.   1/8/2019 at 2000   • Capsaicin (CAPZASIN-HP) 0.1 % Cream 1 Application by Apply externally route 4 times a day as needed.   1/15/2019 at 1200   • cloNIDine (CATAPRES) 0.1 MG/24HR PATCH WEEKLY Apply 1 Patch to skin as directed every 7 days.   1/10/19 at 0800   • divalproex (DEPAKOTE) 250 MG Tablet Delayed Response Take 250 mg by mouth every day.   1/14/2019 at 1600   • rivaroxaban (XARELTO) 20 MG Tab tablet Take 20 mg by mouth with dinner.   1/14/2019 at 1800   • QUEtiapine (SEROQUEL) 100 MG Tab Take 100 mg by mouth every bedtime.   1/8/2019 at 2000   • donepezil (ARICEPT) 5 MG Tab Take 5 mg by mouth every evening.   1/8/2019 at 2000       Medication Allergy:  Allergies   Allergen Reactions   • Pcn [Penicillins] Anaphylaxis       Family History:  Family History   Problem Relation Age of Onset   • Heart Disease Mother        Social History:  Social History     Social History   • Marital status:      Spouse name: N/A   • Number of children: N/A   • Years of education: N/A     Occupational History   • Not on file.     Social History Main Topics   • Smoking status: Former Smoker     Quit date: 2/2/2000   • Smokeless tobacco: Never Used      Comment: smoked for 35 yrs before he quit in 2000   • Alcohol use No   • Drug use: No   • Sexual activity: Not on file     Other Topics Concern   • Not on file     Social History Narrative   • No narrative on file         Physical Exam:  Vitals/ General Appearance:   Weight/BMI: Body mass index is 28.45 kg/m².  Blood pressure 130/78, pulse 83, temperature 36.7 °C (98 °F), temperature source Temporal, resp. rate (!) 24, height 1.88 m (6' 2\"), weight 100.5 kg (221 lb 9 oz), SpO2 96 %.  Vitals:    01/15/19 2208 01/16/19 0000 01/16/19 0127 01/16/19 0133   BP: 134/78 148/65 143/86 130/78   Pulse: 98 86 83 83   Resp: (!) 22 (!) 26 (!) 26 (!) 24   Temp: 36.1 °C (97 °F) 36.7 °C (98 °F)     TempSrc: Temporal Temporal     SpO2: 95% 96% 96% 96%   Weight:       Height:     "     Oxygen Therapy:  Pulse Oximetry: 96 %, O2 (LPM): 0, O2 Delivery: None (Room Air)    Constitutional:   Well developed, Well nourished, No acute distress  HENMT:  Normocephalic, Atraumatic, Oropharynx moist mucous membranes, No oral exudates, Nose normal.  No thyromegaly.  Eyes:  EOMI, Conjunctiva normal, No discharge.  Neck:  Normal range of motion, No cervical tenderness,  no JVD.  Cardiovascular:  Irregularly irregular, No murmurs, No rubs, No gallops.   Extremitites with intact distal pulses, no cyanosis, or edema.  Lungs:  Normal breath sounds, breath sounds clear to auscultation bilaterally,  no crackles, no wheezing.   Abdomen: Bowel sounds normal, Soft, No tenderness, No guarding, No rebound, No masses, No hepatosplenomegaly.  Skin: Warm, Dry, No erythema, No rash, no induration.  Neurologic: Alert & oriented x 3, No focal deficits noted, cranial nerves II through X are grossly intact.  Psychiatric: Affect normal, Judgment normal, Mood normal.      MDM (Data Review):     Records reviewed and summarized in current documentation    Lab Data Review:  Recent Results (from the past 24 hour(s))   Troponin    Collection Time: 01/15/19 12:45 PM   Result Value Ref Range    Troponin I 0.05 (H) 0.00 - 0.04 ng/mL   Btype Natriuretic Peptide    Collection Time: 01/15/19 12:45 PM   Result Value Ref Range    B Natriuretic Peptide 163 (H) 0 - 100 pg/mL   CBC with Differential    Collection Time: 01/15/19 12:45 PM   Result Value Ref Range    WBC 9.5 4.8 - 10.8 K/uL    RBC 4.65 (L) 4.70 - 6.10 M/uL    Hemoglobin 13.3 (L) 14.0 - 18.0 g/dL    Hematocrit 40.7 (L) 42.0 - 52.0 %    MCV 87.5 81.4 - 97.8 fL    MCH 28.6 27.0 - 33.0 pg    MCHC 32.7 (L) 33.7 - 35.3 g/dL    RDW 51.6 (H) 35.9 - 50.0 fL    Platelet Count 360 164 - 446 K/uL    MPV 9.7 9.0 - 12.9 fL    Neutrophils-Polys 79.90 (H) 44.00 - 72.00 %    Lymphocytes 12.80 (L) 22.00 - 41.00 %    Monocytes 5.10 0.00 - 13.40 %    Eosinophils 1.60 0.00 - 6.90 %    Basophils 0.10  0.00 - 1.80 %    Immature Granulocytes 0.50 0.00 - 0.90 %    Nucleated RBC 0.00 /100 WBC    Neutrophils (Absolute) 7.60 (H) 1.82 - 7.42 K/uL    Lymphs (Absolute) 1.22 1.00 - 4.80 K/uL    Monos (Absolute) 0.49 0.00 - 0.85 K/uL    Eos (Absolute) 0.15 0.00 - 0.51 K/uL    Baso (Absolute) 0.01 0.00 - 0.12 K/uL    Immature Granulocytes (abs) 0.05 0.00 - 0.11 K/uL    NRBC (Absolute) 0.00 K/uL   Complete Metabolic Panel (CMP)    Collection Time: 01/15/19 12:45 PM   Result Value Ref Range    Sodium 138 135 - 145 mmol/L    Potassium 3.7 3.6 - 5.5 mmol/L    Chloride 104 96 - 112 mmol/L    Co2 24 20 - 33 mmol/L    Anion Gap 10.0 0.0 - 11.9    Glucose 117 (H) 65 - 99 mg/dL    Bun 18 8 - 22 mg/dL    Creatinine 1.26 0.50 - 1.40 mg/dL    Calcium 8.9 8.5 - 10.5 mg/dL    AST(SGOT) 73 (H) 12 - 45 U/L    ALT(SGPT) 12 2 - 50 U/L    Alkaline Phosphatase 74 30 - 99 U/L    Total Bilirubin 0.7 0.1 - 1.5 mg/dL    Albumin 3.6 3.2 - 4.9 g/dL    Total Protein 6.3 6.0 - 8.2 g/dL    Globulin 2.7 1.9 - 3.5 g/dL    A-G Ratio 1.3 g/dL   Prothrombin Time    Collection Time: 01/15/19 12:45 PM   Result Value Ref Range    PT 13.6 12.0 - 14.6 sec    INR 1.03 0.87 - 1.13   APTT    Collection Time: 01/15/19 12:45 PM   Result Value Ref Range    APTT 26.5 24.7 - 36.0 sec   Lipase    Collection Time: 01/15/19 12:45 PM   Result Value Ref Range    Lipase 27 11 - 82 U/L   ESTIMATED GFR    Collection Time: 01/15/19 12:45 PM   Result Value Ref Range    GFR If African American >60 >60 mL/min/1.73 m 2    GFR If Non African American 55 (A) >60 mL/min/1.73 m 2   EKG    Collection Time: 01/15/19 12:45 PM   Result Value Ref Range    Report       [facility]    Test Date:  2019-01-15  Pt Name:    BRIAN BRODY                    Department: ER  MRN:        [id number]                      Room:       [facility]  Gender:     Male                         Technician: [id number]  :        1940                   Requested By:ER TRIAGE PROTOCOL  Order  #:    [id number]                    Reading MD: ROSIO STRAUSS MD    Measurements  Intervals                                Axis  Rate:       60                           P:          0  PA:         194                          QRS:        -64  QRSD:       160                          T:          5  QT:         488  QTc:        488    Interpretive Statements  SINUS RHYTHM  RBBB AND LAFB  No previous ECG available for comparison    Electronically Signed On 1- 13:01:43 PST by ROSIO STRAUSS MD     TROPONIN    Collection Time: 01/15/19 10:12 PM   Result Value Ref Range    Troponin I 307.36 (H) 0.00 - 0.04 ng/mL   EKG    Collection Time: 01/15/19 10:16 PM   Result Value Ref Range    Report       [facility]    Test Date:  2019-01-15  Pt Name:    BRIAN BRODY                    Department: 171  MRN:        [id number]                      Room:       [facility]  Gender:     Male                         Technician: SHAHLA  :        1940                   Requested By:DORENE PATEL  Order #:    [id number]                    Reading MD: Gee Warren MD    Measurements  Intervals                                Axis  Rate:       101                          P:  PA:                                      QRS:        -70  QRSD:       142                          T:          59  QT:         384  QTc:        498    Interpretive Statements  ATRIAL FIBRILLATION, V-RATE    RBBB AND LAFB  BASELINE WANDER IN LEAD(S) III,V4  Compared to ECG 01/15/2019 12:45:53  Sinus rhythm no longer present    Electronically Signed On 2019 0:18:42 PST by Gee Warren MD     TROPONIN    Collection Time: 01/15/19 11:55 PM   Result Value Ref Range    Troponin I 360.18 (H) 0.00 - 0.04 ng/mL   EKG    Collection Time: 19  1:33 AM   Result Value Ref Range    Report       [facility]    Test Date:  2019  Pt Name:    BRIAN BRODY                    Department: 171  MRN:        [id number]                      Room:        T703  Gender:     Male                         Technician: SHAHLA  :        1940                   Requested By:SOFIE ANDERSON WILFRIDO  Order #:    [id number]                    Reading MD:    Measurements  Intervals                                Axis  Rate:       79                           P:  DE:                                      QRS:        -65  QRSD:       152                          T:          49  QT:         424  QTc:        487    Interpretive Statements  ATRIAL FIBRILLATION  RBBB AND LAFB  Compared to ECG 01/15/2019 22:16:49  No significant changes         Imaging/Procedures Review:    Chest Xray:  Reviewed    EKG:   Personally reviewed by myself showing small STEMI at I, AVL and V6    ECHO:  Pending    MDM (Assessment and Plan):     Active Hospital Problems    Diagnosis   • Paroxysmal atrial fibrillation (HCC) [I48.0]   •   Rule out thoracic or abdominal aortic aneurysm (AAA) without rupture (HCC) [I71.4]   • Essential hypertension [I10]   • Precordial pain [R07.2]   • Alzheimer's disease [G30.9, F02.80]   • Recurrent major depressive disorder, in full remission (HCC) [F33.42]   • Hypothyroid [E03.9]   • GERD (gastroesophageal reflux disease) [K21.9]     Assessment / Plan:  #STEMI  #Atrial Fibrillation    #HTN  #Alzheimer's disease  #Hypothyroidism  #GERD  Reporting 2 day h/o of left shoulder 6/10 pain  Shoulder pain worsens with palpation  EKG showed STEMI at leads I, aVL and V6  Initial trop neg, f/u Trop in the 300s, peaked at 360.18, trending down   Cath showed total (likely chronic) LAD occlusion with slight blood surplice from collateral RCA branch. Nondominant LCA with 60% stenosis and RCA dominant.   Started medical therapy  Started BB therapy w/Carvedilol  Started High does statin therapy  Continue ASA  Continue Digoxin and Xarelto for afib  Pending ECHO  Labs on AM      It is my pleasure to participate in the care of Mrs. Melgar.  Please do not hesitate to contact us with questions or  concerns.    Mert Cardenas  1/16/2019

## 2019-01-16 NOTE — PROGRESS NOTES
Pt. Brought to the floor by cath. Team.  Patient is agitated, rolling back and forth stating that he is uncomfortable.   Patients urine from mckenzie cath. Is bloody.  Bladder scan ordered with scant urine in bladder.  Called dr. Warren for transfer order to [facility] and to inform him of his blood urine and continue left clavical pain.  MD wants to leave mckenzie in at this time and revaluate in the am.  Okay for lidocaine to use on penile area for discomfort.

## 2019-01-17 ENCOUNTER — APPOINTMENT (OUTPATIENT)
Dept: CARDIOLOGY | Facility: MEDICAL CENTER | Age: 79
DRG: 281 | End: 2019-01-17
Attending: INTERNAL MEDICINE
Payer: MEDICARE

## 2019-01-17 PROBLEM — I71.40 ABDOMINAL AORTIC ANEURYSM (AAA) WITHOUT RUPTURE (HCC): Status: RESOLVED | Noted: 2019-01-15 | Resolved: 2019-01-17

## 2019-01-17 PROBLEM — K63.89 COLONIC MASS: Status: ACTIVE | Noted: 2019-01-17

## 2019-01-17 PROBLEM — R16.0 LIVER MASSES: Status: ACTIVE | Noted: 2019-01-17

## 2019-01-17 LAB
ANION GAP SERPL CALC-SCNC: 8 MMOL/L (ref 0–11.9)
BUN SERPL-MCNC: 20 MG/DL (ref 8–22)
CALCIUM SERPL-MCNC: 9.4 MG/DL (ref 8.5–10.5)
CHLORIDE SERPL-SCNC: 103 MMOL/L (ref 96–112)
CO2 SERPL-SCNC: 25 MMOL/L (ref 20–33)
CREAT SERPL-MCNC: 1.31 MG/DL (ref 0.5–1.4)
EKG IMPRESSION: NORMAL
EKG IMPRESSION: NORMAL
GLUCOSE SERPL-MCNC: 85 MG/DL (ref 65–99)
LV EJECT FRACT  99904: 45
LV EJECT FRACT MOD 4C 99902: 46.36
POTASSIUM SERPL-SCNC: 4.2 MMOL/L (ref 3.6–5.5)
SODIUM SERPL-SCNC: 136 MMOL/L (ref 135–145)
TROPONIN I SERPL-MCNC: 53.92 NG/ML (ref 0–0.04)

## 2019-01-17 PROCEDURE — 99233 SBSQ HOSP IP/OBS HIGH 50: CPT | Performed by: HOSPITALIST

## 2019-01-17 PROCEDURE — 93306 TTE W/DOPPLER COMPLETE: CPT | Mod: 26 | Performed by: INTERNAL MEDICINE

## 2019-01-17 PROCEDURE — 700102 HCHG RX REV CODE 250 W/ 637 OVERRIDE(OP): Performed by: STUDENT IN AN ORGANIZED HEALTH CARE EDUCATION/TRAINING PROGRAM

## 2019-01-17 PROCEDURE — A9270 NON-COVERED ITEM OR SERVICE: HCPCS | Performed by: HOSPITALIST

## 2019-01-17 PROCEDURE — 700117 HCHG RX CONTRAST REV CODE 255: Performed by: INTERNAL MEDICINE

## 2019-01-17 PROCEDURE — 93010 ELECTROCARDIOGRAM REPORT: CPT | Mod: 76 | Performed by: INTERNAL MEDICINE

## 2019-01-17 PROCEDURE — 770020 HCHG ROOM/CARE - TELE (206)

## 2019-01-17 PROCEDURE — 700102 HCHG RX REV CODE 250 W/ 637 OVERRIDE(OP): Performed by: INTERNAL MEDICINE

## 2019-01-17 PROCEDURE — 93306 TTE W/DOPPLER COMPLETE: CPT

## 2019-01-17 PROCEDURE — 700111 HCHG RX REV CODE 636 W/ 250 OVERRIDE (IP): Performed by: INTERNAL MEDICINE

## 2019-01-17 PROCEDURE — 80048 BASIC METABOLIC PNL TOTAL CA: CPT

## 2019-01-17 PROCEDURE — 99232 SBSQ HOSP IP/OBS MODERATE 35: CPT | Mod: GC | Performed by: INTERNAL MEDICINE

## 2019-01-17 PROCEDURE — 93005 ELECTROCARDIOGRAM TRACING: CPT | Performed by: INTERNAL MEDICINE

## 2019-01-17 PROCEDURE — 84484 ASSAY OF TROPONIN QUANT: CPT

## 2019-01-17 PROCEDURE — 700102 HCHG RX REV CODE 250 W/ 637 OVERRIDE(OP): Performed by: HOSPITALIST

## 2019-01-17 PROCEDURE — 93010 ELECTROCARDIOGRAM REPORT: CPT | Performed by: INTERNAL MEDICINE

## 2019-01-17 PROCEDURE — A9270 NON-COVERED ITEM OR SERVICE: HCPCS | Performed by: INTERNAL MEDICINE

## 2019-01-17 PROCEDURE — 93005 ELECTROCARDIOGRAM TRACING: CPT | Performed by: HOSPITALIST

## 2019-01-17 PROCEDURE — A9270 NON-COVERED ITEM OR SERVICE: HCPCS | Performed by: STUDENT IN AN ORGANIZED HEALTH CARE EDUCATION/TRAINING PROGRAM

## 2019-01-17 RX ADMIN — ALPRAZOLAM 0.25 MG: 0.25 TABLET ORAL at 23:41

## 2019-01-17 RX ADMIN — OMEPRAZOLE 20 MG: 20 CAPSULE, DELAYED RELEASE ORAL at 05:29

## 2019-01-17 RX ADMIN — DONEPEZIL HYDROCHLORIDE 5 MG: 5 TABLET, FILM COATED ORAL at 20:21

## 2019-01-17 RX ADMIN — DIGOXIN 250 MCG: 125 TABLET ORAL at 15:11

## 2019-01-17 RX ADMIN — OXYCODONE HYDROCHLORIDE 10 MG: 10 TABLET ORAL at 15:11

## 2019-01-17 RX ADMIN — ESCITALOPRAM OXALATE 20 MG: 10 TABLET ORAL at 05:29

## 2019-01-17 RX ADMIN — MORPHINE SULFATE 2 MG: 4 INJECTION INTRAVENOUS at 14:02

## 2019-01-17 RX ADMIN — LEVOTHYROXINE SODIUM 25 MCG: 50 TABLET ORAL at 05:29

## 2019-01-17 RX ADMIN — CARVEDILOL 3.12 MG: 3.12 TABLET, FILM COATED ORAL at 07:50

## 2019-01-17 RX ADMIN — DIVALPROEX SODIUM 250 MG: 250 TABLET, DELAYED RELEASE ORAL at 07:50

## 2019-01-17 RX ADMIN — HUMAN ALBUMIN MICROSPHERES AND PERFLUTREN 3 ML: 10; .22 INJECTION, SOLUTION INTRAVENOUS at 11:45

## 2019-01-17 RX ADMIN — ATORVASTATIN CALCIUM 80 MG: 80 TABLET, FILM COATED ORAL at 17:05

## 2019-01-17 RX ADMIN — CLONIDINE 1 PATCH: 0.1 PATCH TRANSDERMAL at 05:28

## 2019-01-17 RX ADMIN — MORPHINE SULFATE 2 MG: 4 INJECTION INTRAVENOUS at 23:59

## 2019-01-17 RX ADMIN — QUETIAPINE FUMARATE 100 MG: 100 TABLET ORAL at 20:21

## 2019-01-17 RX ADMIN — ASPIRIN 81 MG: 81 TABLET, COATED ORAL at 05:28

## 2019-01-17 RX ADMIN — CARVEDILOL 3.12 MG: 3.12 TABLET, FILM COATED ORAL at 17:05

## 2019-01-17 ASSESSMENT — PAIN SCALES - GENERAL
PAINLEVEL_OUTOF10: 6
PAINLEVEL_OUTOF10: 6
PAINLEVEL_OUTOF10: 8
PAINLEVEL_OUTOF10: 3
PAINLEVEL_OUTOF10: 0
PAINLEVEL_OUTOF10: 6
PAINLEVEL_OUTOF10: 5
PAINLEVEL_OUTOF10: 3
PAINLEVEL_OUTOF10: 6
PAINLEVEL_OUTOF10: 6

## 2019-01-17 NOTE — ASSESSMENT & PLAN NOTE
Noted on CT: unclear if significant  Also noted possible hepatic masses  Checking CT hepatic protocol: if neg will need colonoscopy.

## 2019-01-17 NOTE — PROGRESS NOTES
Hospital Medicine Daily Progress Note    Date of Service  1/17/2019    Chief Complaint  78 y.o. male admitted 1/15/2019 with CP    Hospital Course    CP x 2 days.  Initial Trop and EKG neg however after admission Trops increased >300 and transferred to CICU.  Went to cath lab on 1/16 with total occlusion of LAD noted.  No interventions, and medical management recomended      Interval Problem Update  ROS limited due to underlying dementia however pt dose c/o some L shoulder pain, unclear to me if it is same/better/worse then on admission    Consultants/Specialty  Cardiology    Code Status  full    Disposition  OK to Tele    Review of Systems  Review of Systems   Unable to perform ROS: Dementia   Cardiovascular: Positive for chest pain.        Physical Exam  Temp:  [35.7 °C (96.2 °F)-35.7 °C (96.3 °F)] 35.7 °C (96.2 °F)  Pulse:  [58-94] 71  Resp:  [15-25] 17  SpO2:  [90 %-100 %] 96 %    Physical Exam   Constitutional: He is oriented to person, place, and time. He appears well-developed and well-nourished. No distress.   HENT:   Head: Normocephalic and atraumatic.   Eyes: Conjunctivae are normal.   Neck: No JVD present.   Cardiovascular: Normal rate.  Exam reveals no gallop.    Murmur heard.  Pulmonary/Chest: Effort normal. No stridor. No respiratory distress. He has no wheezes. He has no rales.   Abdominal: Soft. There is no tenderness. There is no rebound and no guarding.   Musculoskeletal: He exhibits no edema.   Neurological: He is oriented to person, place, and time.   O x 3 however confused as to recent events and engages in repetetive questioning of this examiner   Skin: Skin is warm and dry. No rash noted. He is not diaphoretic.   Psychiatric: He has a normal mood and affect. Thought content normal.   Nursing note and vitals reviewed.      Fluids    Intake/Output Summary (Last 24 hours) at 01/17/19 0614  Last data filed at 01/17/19 0500   Gross per 24 hour   Intake          1045.83 ml   Output             1740  ml   Net          -694.17 ml       Laboratory  Recent Labs      01/15/19   1245   WBC  9.5   RBC  4.65*   HEMOGLOBIN  13.3*   HEMATOCRIT  40.7*   MCV  87.5   MCH  28.6   MCHC  32.7*   RDW  51.6*   PLATELETCT  360   MPV  9.7     Recent Labs      01/15/19   1245  01/16/19   0530   SODIUM  138  138   POTASSIUM  3.7  4.0   CHLORIDE  104  106   CO2  24  21   GLUCOSE  117*  91   BUN  18  19   CREATININE  1.26  1.26   CALCIUM  8.9  9.6     Recent Labs      01/15/19   1245   APTT  26.5   INR  1.03     Recent Labs      01/15/19   1245   BNPBTYPENAT  163*     Recent Labs      01/16/19   1030   TRIGLYCERIDE  112   HDL  24*   LDL  45       Imaging  CT-CTA COMPLETE THORACOABDOMINAL AORTA   Final Result      No aortic aneurysm or dissection.      Mild to moderate stenosis of the celiac trunk. Mild stenosis of the SMA, left renal artery and DOC. Moderate stenosis of the proximal right renal artery.      Indeterminate hypodense hepatic lesions could represent metastatic disease. Further evaluation with hepatic protocol CT or MRI is recommended.      Focal short segment distention with wall thickening or mass involving the terminal ileum. Further evaluation can be obtained with colonoscopy or CT enterography.      Colonic diverticulosis.      Atherosclerotic plaque.      Mildly prominent right abdominal mesenteric lymph node is nonspecific.      Emphysematous changes.      Borderline prominent hilar lymph nodes are nonspecific.      Cardiomegaly. Coronary artery calcification.      Nonvisualization of the appendix.      Bilateral bronchial wall thickening can be seen in the setting of bronchitis.      Small hypodense left renal lesions are too small to characterize.               CT-HEAD W/O   Final Result      No acute intracranial abnormality is identified.      There are periventricular and subcortical white matter changes present.  This finding is nonspecific and could be from previous small vessel ischemia, demyelination, or  gliosis.      Mild atrophy.      Mild mucosal thickening in the right maxillary sinus.      DX-CHEST-LIMITED (1 VIEW)   Final Result      No acute cardiopulmonary process is identified.      Atherosclerotic plaque.            EC-ECHOCARDIOGRAM COMPLETE W/O CONT    (Results Pending)        Assessment/Plan  * Precordial pain- (present on admission)   Assessment & Plan    NSTEMI  Total occlusion LAD  S/p cath with no intervention; recomending medical management  ASA  High dose statin  Coreg  Dig  Consider ACEI       Colonic mass   Assessment & Plan    Noted on CT: unclear if significant  Also noted possible hepatic masses  Checking CT hepatic protocol: if neg will need colonoscopy.     Liver masses- (present on admission)   Assessment & Plan    Unclear significance  Check CT hepatic protocol     Recurrent major depressive disorder, in full remission (HCC)- (present on admission)   Assessment & Plan    Continue Seroquel and Lexapro     Alzheimer's disease- (present on admission)   Assessment & Plan    Continue Aricept     Essential hypertension- (present on admission)   Assessment & Plan    Continue clonidine     Paroxysmal atrial fibrillation (HCC)- (present on admission)   Assessment & Plan    Continue digoxin and Xarelto     GERD (gastroesophageal reflux disease)- (present on admission)   Assessment & Plan    PPI     Hypothyroid- (present on admission)   Assessment & Plan    Continue Synthroid          VTE prophylaxis: heparin

## 2019-01-17 NOTE — CARE PLAN
Problem: Communication  Goal: The ability to communicate needs accurately and effectively will improve  Outcome: PROGRESSING SLOWER THAN EXPECTED  Not communicating all needs effectively.  Short term memory loss, poor safety awareness.    Problem: Safety  Goal: Will remain free from injury  Outcome: PROGRESSING AS EXPECTED  Patient has a sitter at bedside for safety.  Patient has very little short term memory and can get impulsive.  Unable to understand safety awareness.  Multiple falls at home.

## 2019-01-17 NOTE — PROGRESS NOTES
Cardiology Progress Note:      Date & Time note created:    1/17/2019   9:22 AM     Referring MD:  Dr. Ureña    Patient ID:   Name:             Paulie Melgar     YOB: 1940  Age:                 78 y.o.  male   MRN:               [id number]                                                             Reason for Consult:      STEMI    History of Present Illness:    77 yo M with PMH of a-fib, HTN presents tonight with chest pain described as a sharp, pressure like sensation in his chest rated a 5-6/10 starting yesterday.  His admitting ECG showed a posterior infarct pattern with a follow up ECG showing an evolving ECG pattern of a posterior STEMI.  His first two troponins were negative however his third was positive at 307.  He continues to have chest pain.  A code STEMI was activated and repeat ECG was obtained. EKG     Event Interval:      1/17/2019 Patient in NAD today. Still with Left shoulder pain. Trops trending down. Undergoing ECHO during interview. Hold off on elective imaging for now, keep on tele monitoring. Re[eat EKG and trop on AM.      1/16/2019 Patient reporting 6/10 shoulder pain. Denies dyspnea, dizziness or palpitations. Patient still upset over anxiety during cath lab procedure which was held as patient was becoming combative. He was noted for a total (likely chronic) LAD occlusion with slight blood surplice from collateral RCA branch. Nondominant LCA with 60% stenosis and RCA dominant. Trops peaked at 360.18. Started medical therapy with ASA, BB therapy, and high does statin. Morphine for pain. Pending ECHO.        Past Medical History:   Past Medical History:   Diagnosis Date   • Cancer (HCC) 1990    lymphoma and melanoma   • GERD (gastroesophageal reflux disease)    • Hodgkins disease (HCC) 2/2/2010   • Hypertension    • Hypothyroid 2/2/2010   • Insomnia 3/22/2010   • Melanoma (HCC) 2/2/2010   • Melanoma in situ of back (HCC) 2/2/2010   • Neuropathy (HCC) 2/11/2010   • Syncopal  episodes 2/2/2010   • Thyroid disease     hypothyroidism     Active Hospital Problems    Diagnosis   • Liver masses [R16.0]   • Colonic mass [K63.9]   • Paroxysmal atrial fibrillation (HCC) [I48.0]   • Essential hypertension [I10]   • Precordial pain [R07.2]   • Alzheimer's disease [G30.9, F02.80]   • Recurrent major depressive disorder, in full remission (HCC) [F33.42]   • Hypothyroid [E03.9]   • GERD (gastroesophageal reflux disease) [K21.9]       Past Surgical History:  Past Surgical History:   Procedure Laterality Date   • ABDOMINAL EXPLORATION      for hodgkins lymphoma   • APPENDECTOMY         Hospital Medications:  Current Facility-Administered Medications   Medication Dose   • morphine (pf) 4 mg/ml injection 2 mg  2 mg    Or   • morphine (pf) 4 mg/ml injection 4 mg  4 mg   • aspirin EC (ECOTRIN) tablet 81 mg  81 mg   • carvedilol (COREG) tablet 3.125 mg  3.125 mg   • atorvastatin (LIPITOR) tablet 80 mg  80 mg   • oxyCODONE immediate-release (ROXICODONE) tablet 5 mg  5 mg    Or   • oxyCODONE immediate release (ROXICODONE) tablet 10 mg  10 mg   • polyethylene glycol/lytes (MIRALAX) PACKET 1 Packet  1 Packet    And   • magnesium hydroxide (MILK OF MAGNESIA) suspension 30 mL  30 mL    And   • bisacodyl (DULCOLAX) suppository 10 mg  10 mg   • cloNIDine (CATAPRES) 0.1 MG/24HR 1 Patch  1 Patch   • digoxin (LANOXIN) tablet 250 mcg  250 mcg   • divalproex (DEPAKOTE) delayed-release tablet 250 mg  250 mg   • donepezil (ARICEPT) tablet 5 mg  5 mg   • escitalopram (LEXAPRO) tablet 20 mg  20 mg   • levothyroxine (SYNTHROID) tablet 25 mcg  25 mcg   • omeprazole (PRILOSEC) capsule 20 mg  20 mg   • QUEtiapine (SEROQUEL) tablet 100 mg  100 mg   • Influenza Vaccine High-Dose pf injection 0.5 mL  0.5 mL   • loperamide (IMODIUM) capsule 2 mg  2 mg   • ALPRAZolam (XANAX) tablet 0.25 mg  0.25 mg         Current Outpatient Medications:  Prescriptions Prior to Admission   Medication Sig Dispense Refill Last Dose   • levothyroxine  (SYNTHROID) 25 MCG Tab Take 25 mcg by mouth Every morning on an empty stomach.   1/15/2019 at 0800   • digoxin (LANOXIN) 250 MCG Tab Take 250 mcg by mouth every 48 hours.   1/14/2019 at 0800   • escitalopram (LEXAPRO) 20 MG tablet Take 20 mg by mouth every day.   1/15/2019 at 0800   • omeprazole (PRILOSEC) 20 MG delayed-release capsule Take 20 mg by mouth every day.   1/8/2019 at 2000   • Capsaicin (CAPZASIN-HP) 0.1 % Cream 1 Application by Apply externally route 4 times a day as needed.   1/15/2019 at 1200   • cloNIDine (CATAPRES) 0.1 MG/24HR PATCH WEEKLY Apply 1 Patch to skin as directed every 7 days.   1/10/19 at 0800   • divalproex (DEPAKOTE) 250 MG Tablet Delayed Response Take 250 mg by mouth every day.   1/14/2019 at 1600   • rivaroxaban (XARELTO) 20 MG Tab tablet Take 20 mg by mouth with dinner.   1/14/2019 at 1800   • QUEtiapine (SEROQUEL) 100 MG Tab Take 100 mg by mouth every bedtime.   1/8/2019 at 2000   • donepezil (ARICEPT) 5 MG Tab Take 5 mg by mouth every evening.   1/8/2019 at 2000       Medication Allergy:  Allergies   Allergen Reactions   • Pcn [Penicillins] Anaphylaxis       Family History:  Family History   Problem Relation Age of Onset   • Heart Disease Mother        Social History:  Social History     Social History   • Marital status:      Spouse name: N/A   • Number of children: N/A   • Years of education: N/A     Occupational History   • Not on file.     Social History Main Topics   • Smoking status: Former Smoker     Quit date: 2/2/2000   • Smokeless tobacco: Never Used      Comment: smoked for 35 yrs before he quit in 2000   • Alcohol use No   • Drug use: No   • Sexual activity: Not on file     Other Topics Concern   • Not on file     Social History Narrative   • No narrative on file         Physical Exam:  Vitals/ General Appearance:   Weight/BMI: Body mass index is 28.45 kg/m².  Blood pressure 130/78, pulse 66, temperature 36.1 °C (97 °F), temperature source Temporal, resp. rate  "18, height 1.88 m (6' 2\"), weight 100.5 kg (221 lb 9 oz), SpO2 98 %.  Vitals:    01/16/19 1900 01/16/19 2000 01/17/19 0100 01/17/19 0800   BP:       Pulse: (!) 58 64 71 66   Resp: (!) 22 15 17 18   Temp:    36.1 °C (97 °F)   TempSrc:    Temporal   SpO2: 100% 98% 96% 98%   Weight:       Height:         Oxygen Therapy:  Pulse Oximetry: 98 %, O2 (LPM): 2, O2 Delivery: Silicone Nasal Cannula    Constitutional:   Well developed, Well nourished, No acute distress  HENMT:  Normocephalic, Atraumatic, Oropharynx moist mucous membranes, No oral exudates, Nose normal.  No thyromegaly.  Eyes:  EOMI, Conjunctiva normal, No discharge.  Neck:  Normal range of motion, No cervical tenderness,  no JVD.  Cardiovascular:  Irregularly irregular, No murmurs, No rubs, No gallops.   Extremitites with intact distal pulses, no cyanosis, or edema.  Lungs:  Normal breath sounds, breath sounds clear to auscultation bilaterally,  no crackles, no wheezing.   Abdomen: Bowel sounds normal, Soft, No tenderness, No guarding, No rebound, No masses, No hepatosplenomegaly.  Skin: Warm, Dry, No erythema, No rash, no induration.  Neurologic: Alert & oriented x 3, No focal deficits noted, cranial nerves II through X are grossly intact.  Psychiatric: Affect normal, Judgment normal, Mood normal.      MDM (Data Review):     Records reviewed and summarized in current documentation    Lab Data Review:  Recent Results (from the past 24 hour(s))   Lipid Profile    Collection Time: 01/16/19 10:30 AM   Result Value Ref Range    Cholesterol,Tot 91 (L) 100 - 199 mg/dL    Triglycerides 112 0 - 149 mg/dL    HDL 24 (A) >=40 mg/dL    LDL 45 <100 mg/dL   MAGNESIUM    Collection Time: 01/16/19 10:30 AM   Result Value Ref Range    Magnesium 1.9 1.5 - 2.5 mg/dL   EKG    Collection Time: 01/16/19  1:12 PM   Result Value Ref Range    Report       [facility]    Test Date:  2019-01-16  Pt Name:    BRIAN BRODY                    Department: 161  MRN:        [id number]           "            Room:       [facility]  Gender:     Male                         Technician: [facility]  :        1940                   Requested By:SHAD HO  Order #:    [id number]                    Reading MD: Danna Zhang MD    Measurements  Intervals                                Axis  Rate:       75                           P:  N[place]:                                      QRS:        -62  QRSD:       144                          T:          59  QT:         432  QTc:        483    Interpretive Statements  ATRIAL FIBRILLATION, V-RATE  60- 85  RIGHT BUNDLE BRANCH BLOCK  Compared to ECG 2019 01:33:06  NO SIGNIFICANT CHANGE  Electronically Signed On 2019 22:43:32 PST by Danna Zhang MD     Basic Metabolic Panel (BMP) Tomorrow AM (LAB)    Collection Time: 19  5:54 AM   Result Value Ref Range    Sodium 136 135 - 145 mmol/L    Potassium 4.2 3.6 - 5.5 mmol/L    Chloride 103 96 - 112 mmol/L    Co2 25 20 - 33 mmol/L    Glucose 85 65 - 99 mg/dL    Bun 20 8 - 22 mg/dL    Creatinine 1.31 0.50 - 1.40 mg/dL    Calcium 9.4 8.5 - 10.5 mg/dL    Anion Gap 8.0 0.0 - 11.9   ESTIMATED GFR    Collection Time: 19  5:54 AM   Result Value Ref Range    GFR If African American >60 >60 mL/min/1.73 m 2    GFR If Non  53 (A) >60 mL/min/1.73 m 2       Imaging/Procedures Review:    Chest Xray:  Reviewed    EKG:   Personally reviewed by myself showing small STEMI at I, AVL and V6    ECHO:  Pending    MDM (Assessment and Plan):     Active Hospital Problems    Diagnosis   • Liver masses [R16.0]   • Colonic mass [K63.9]   • Paroxysmal atrial fibrillation (HCC) [I48.0]   • Essential hypertension [I10]   • Precordial pain [R07.2]   • Alzheimer's disease [G30.9, F02.80]   • Recurrent major depressive disorder, in full remission (HCC) [F33.42]   • Hypothyroid [E03.9]   • GERD (gastroesophageal reflux disease) [K21.9]     Assessment / Plan:  #STEMI  #Atrial Fibrillation    #HTN  #Alzheimer's  disease  #Hypothyroidism  #GERD  Reporting 2 day h/o of left shoulder 6/10 pain  Shoulder pain worsens with palpation  EKG showed STEMI at leads I, aVL and V6  Initial trop neg, f/u Trop in the 300s, peaked at 360.18, trending down   Cath showed total (likely chronic) LAD occlusion with slight blood surplice from collateral RCA branch. Nondominant LCA with 60% stenosis and RCA dominant.   Continue medical therapy  Continue BB therapy w/Carvedilol  Continue High does statin therapy  Continue ASA  Continue Digoxin and Xarelto for afib  Pending ECHO report  Labs on AM      It is my pleasure to participate in the care of Mr. Melgar.  Please do not hesitate to contact us with questions or concerns.    Mert Cardenas  1/17/2019

## 2019-01-17 NOTE — PROGRESS NOTES
Spoke with patient's son, Rick (POA) and daughter in law. Gave update on patient status. Discussed code status, both family and patient stated desire for patient to be DNR/DNI. Dr. Rivera updated, will make change code status in EPIC.

## 2019-01-18 ENCOUNTER — APPOINTMENT (OUTPATIENT)
Dept: RADIOLOGY | Facility: MEDICAL CENTER | Age: 79
DRG: 281 | End: 2019-01-18
Attending: HOSPITALIST
Payer: MEDICARE

## 2019-01-18 VITALS
SYSTOLIC BLOOD PRESSURE: 130 MMHG | HEIGHT: 74 IN | OXYGEN SATURATION: 90 % | RESPIRATION RATE: 18 BRPM | TEMPERATURE: 97.5 F | DIASTOLIC BLOOD PRESSURE: 78 MMHG | HEART RATE: 60 BPM | BODY MASS INDEX: 28.43 KG/M2 | WEIGHT: 221.56 LBS

## 2019-01-18 LAB
ANION GAP SERPL CALC-SCNC: 15 MMOL/L (ref 0–11.9)
BUN SERPL-MCNC: 23 MG/DL (ref 8–22)
CALCIUM SERPL-MCNC: 8.7 MG/DL (ref 8.5–10.5)
CHLORIDE SERPL-SCNC: 106 MMOL/L (ref 96–112)
CO2 SERPL-SCNC: 17 MMOL/L (ref 20–33)
CREAT SERPL-MCNC: 1.38 MG/DL (ref 0.5–1.4)
EKG IMPRESSION: NORMAL
GLUCOSE SERPL-MCNC: 92 MG/DL (ref 65–99)
POTASSIUM SERPL-SCNC: 4.3 MMOL/L (ref 3.6–5.5)
SODIUM SERPL-SCNC: 138 MMOL/L (ref 135–145)
TROPONIN I SERPL-MCNC: 43.56 NG/ML (ref 0–0.04)

## 2019-01-18 PROCEDURE — 700102 HCHG RX REV CODE 250 W/ 637 OVERRIDE(OP): Performed by: HOSPITALIST

## 2019-01-18 PROCEDURE — A9270 NON-COVERED ITEM OR SERVICE: HCPCS | Performed by: HOSPITALIST

## 2019-01-18 PROCEDURE — 74160 CT ABDOMEN W/CONTRAST: CPT

## 2019-01-18 PROCEDURE — 84484 ASSAY OF TROPONIN QUANT: CPT

## 2019-01-18 PROCEDURE — 93005 ELECTROCARDIOGRAM TRACING: CPT | Performed by: STUDENT IN AN ORGANIZED HEALTH CARE EDUCATION/TRAINING PROGRAM

## 2019-01-18 PROCEDURE — A9270 NON-COVERED ITEM OR SERVICE: HCPCS | Performed by: STUDENT IN AN ORGANIZED HEALTH CARE EDUCATION/TRAINING PROGRAM

## 2019-01-18 PROCEDURE — 99239 HOSP IP/OBS DSCHRG MGMT >30: CPT | Performed by: HOSPITALIST

## 2019-01-18 PROCEDURE — 93010 ELECTROCARDIOGRAM REPORT: CPT | Performed by: INTERNAL MEDICINE

## 2019-01-18 PROCEDURE — 700117 HCHG RX CONTRAST REV CODE 255: Performed by: HOSPITALIST

## 2019-01-18 PROCEDURE — 80048 BASIC METABOLIC PNL TOTAL CA: CPT

## 2019-01-18 PROCEDURE — 700102 HCHG RX REV CODE 250 W/ 637 OVERRIDE(OP): Performed by: STUDENT IN AN ORGANIZED HEALTH CARE EDUCATION/TRAINING PROGRAM

## 2019-01-18 PROCEDURE — 99232 SBSQ HOSP IP/OBS MODERATE 35: CPT | Mod: GC | Performed by: INTERNAL MEDICINE

## 2019-01-18 RX ORDER — MORPHINE SULFATE 4 MG/ML
4 INJECTION, SOLUTION INTRAMUSCULAR; INTRAVENOUS
Status: DISCONTINUED | OUTPATIENT
Start: 2019-01-18 | End: 2019-01-18 | Stop reason: HOSPADM

## 2019-01-18 RX ORDER — ATORVASTATIN CALCIUM 80 MG/1
80 TABLET, FILM COATED ORAL EVERY EVENING
Qty: 30 TAB | Refills: 0 | Status: SHIPPED | OUTPATIENT
Start: 2019-01-18

## 2019-01-18 RX ORDER — LISINOPRIL 2.5 MG/1
2.5 TABLET ORAL
Status: DISCONTINUED | OUTPATIENT
Start: 2019-01-18 | End: 2019-01-18 | Stop reason: HOSPADM

## 2019-01-18 RX ORDER — MORPHINE SULFATE 4 MG/ML
2 INJECTION, SOLUTION INTRAMUSCULAR; INTRAVENOUS
Status: DISCONTINUED | OUTPATIENT
Start: 2019-01-18 | End: 2019-01-18 | Stop reason: HOSPADM

## 2019-01-18 RX ORDER — LISINOPRIL 2.5 MG/1
2.5 TABLET ORAL
Qty: 30 TAB | Refills: 0 | Status: SHIPPED | OUTPATIENT
Start: 2019-01-18

## 2019-01-18 RX ORDER — CARVEDILOL 3.12 MG/1
3.12 TABLET ORAL 2 TIMES DAILY WITH MEALS
Qty: 60 TAB | Refills: 0 | Status: SHIPPED | OUTPATIENT
Start: 2019-01-18

## 2019-01-18 RX ORDER — ASPIRIN 81 MG/1
81 TABLET ORAL DAILY
Qty: 100 TAB | Refills: 0 | Status: SHIPPED | OUTPATIENT
Start: 2019-01-19

## 2019-01-18 RX ADMIN — IOHEXOL 100 ML: 350 INJECTION, SOLUTION INTRAVENOUS at 15:22

## 2019-01-18 RX ADMIN — CARVEDILOL 3.12 MG: 3.12 TABLET, FILM COATED ORAL at 08:36

## 2019-01-18 RX ADMIN — CARVEDILOL 3.12 MG: 3.12 TABLET, FILM COATED ORAL at 17:19

## 2019-01-18 RX ADMIN — ESCITALOPRAM OXALATE 20 MG: 10 TABLET ORAL at 05:10

## 2019-01-18 RX ADMIN — RIVAROXABAN 20 MG: 20 TABLET, FILM COATED ORAL at 17:19

## 2019-01-18 RX ADMIN — LEVOTHYROXINE SODIUM 25 MCG: 50 TABLET ORAL at 05:11

## 2019-01-18 RX ADMIN — DIVALPROEX SODIUM 250 MG: 250 TABLET, DELAYED RELEASE ORAL at 05:11

## 2019-01-18 RX ADMIN — ATORVASTATIN CALCIUM 80 MG: 80 TABLET, FILM COATED ORAL at 17:19

## 2019-01-18 RX ADMIN — ASPIRIN 81 MG: 81 TABLET, COATED ORAL at 05:10

## 2019-01-18 RX ADMIN — OMEPRAZOLE 20 MG: 20 CAPSULE, DELAYED RELEASE ORAL at 05:09

## 2019-01-18 ASSESSMENT — PAIN SCALES - GENERAL
PAINLEVEL_OUTOF10: 3
PAINLEVEL_OUTOF10: 6
PAINLEVEL_OUTOF10: 3
PAINLEVEL_OUTOF10: 6
PAINLEVEL_OUTOF10: 3
PAINLEVEL_OUTOF10: 8
PAINLEVEL_OUTOF10: 3
PAINLEVEL_OUTOF10: 3
PAINLEVEL_OUTOF10: 6

## 2019-01-18 NOTE — PROGRESS NOTES
For entirety of shift, pt stating he is having same shoulder pain he has been having since admission. Morphine given. Stat EKG ordered. No changes except for left anterior fascicular block is now absent.   Troponins collected. Physician not updated per policy due to decreasing trend of troponins

## 2019-01-18 NOTE — DIETARY
Nutrition Services: Consult cardiac rehab diet education    Pt is a 78 y.o. Male with Dx: Chest pain    Admit day 2.  H/o A-fib, HTN, lymphoma, melanoma  S/p cardiac cath 1/16; +CAD, EF 30%, medical management per Cardiology.  Labs: Cholesterol 91, HDL 24; no HA1c.    RD has attempted diet education daily since 1/16. Left dietary handouts outside of room. Included specific information for low-sodium diet per EF 30%.  Attempted diet education again today, but MD was in room with pt. Noted handouts were no longer outside of room, so assume RN has provided them to pt.   Cardiac diet in place. No nutrition triggers per admit screen.    RD available for diet education as needed; please call [phone].

## 2019-01-18 NOTE — PROGRESS NOTES
Cardiology Progress Note:      Date & Time note created:    1/18/2019   7:24 AM     Referring MD:  Dr. Ureña    Patient ID:   Name:             Paulie Melgar     YOB: 1940  Age:                 78 y.o.  male   MRN:               [id number]                                                             Reason for Consult:      STEMI    History of Present Illness:    77 yo M with PMH of a-fib, HTN presents tonight with chest pain described as a sharp, pressure like sensation in his chest rated a 5-6/10 starting yesterday.  His admitting ECG showed a posterior infarct pattern with a follow up ECG showing an evolving ECG pattern of a posterior STEMI.  His first two troponins were negative however his third was positive at 307.  He continues to have chest pain.  A code STEMI was activated and repeat ECG was obtained.    Event Interval:      1/18/2019 Patient still with left shoulder pain. EKG yesterday w/o sig changes. Trop trending down. Started ACEi therapy today.      1/17/2019 Patient in NAD today. Still with Left shoulder pain. Trops trending down. Undergoing ECHO during interview. Hold off on elective imaging for now, keep on tele monitoring. Reteat EKG and trop on AM.      1/16/2019 Patient reporting 6/10 shoulder pain. Denies dyspnea, dizziness or palpitations. Patient still upset over anxiety during cath lab procedure which was held as patient was becoming combative. He was noted for a total (likely chronic) LAD occlusion with slight blood surplice from collateral RCA branch. Nondominant LCA with 60% stenosis and RCA dominant. Trops peaked at 360.18. Started medical therapy with ASA, BB therapy, and high does statin. Morphine for pain. Pending ECHO.        Past Medical History:   Past Medical History:   Diagnosis Date   • Cancer (HCC) 1990    lymphoma and melanoma   • GERD (gastroesophageal reflux disease)    • Hodgkins disease (HCC) 2/2/2010   • Hypertension    • Hypothyroid 2/2/2010   • Insomnia  3/22/2010   • Melanoma (HCC) 2/2/2010   • Melanoma in situ of back (HCC) 2/2/2010   • Neuropathy (HCC) 2/11/2010   • Syncopal episodes 2/2/2010   • Thyroid disease     hypothyroidism     Active Hospital Problems    Diagnosis   • Liver masses [R16.0]   • Colonic mass [K63.9]   • Paroxysmal atrial fibrillation (HCC) [I48.0]   • Essential hypertension [I10]   • Precordial pain [R07.2]   • Alzheimer's disease [G30.9, F02.80]   • Recurrent major depressive disorder, in full remission (HCC) [F33.42]   • Hypothyroid [E03.9]   • GERD (gastroesophageal reflux disease) [K21.9]       Past Surgical History:  Past Surgical History:   Procedure Laterality Date   • ABDOMINAL EXPLORATION      for hodgkins lymphoma   • APPENDECTOMY         Hospital Medications:  Current Facility-Administered Medications   Medication Dose   • morphine (pf) 4 mg/ml injection 2 mg  2 mg    Or   • morphine (pf) 4 mg/ml injection 4 mg  4 mg   • rivaroxaban (XARELTO) tablet 20 mg  20 mg   • aspirin EC (ECOTRIN) tablet 81 mg  81 mg   • carvedilol (COREG) tablet 3.125 mg  3.125 mg   • atorvastatin (LIPITOR) tablet 80 mg  80 mg   • oxyCODONE immediate-release (ROXICODONE) tablet 5 mg  5 mg    Or   • oxyCODONE immediate release (ROXICODONE) tablet 10 mg  10 mg   • polyethylene glycol/lytes (MIRALAX) PACKET 1 Packet  1 Packet    And   • magnesium hydroxide (MILK OF MAGNESIA) suspension 30 mL  30 mL    And   • bisacodyl (DULCOLAX) suppository 10 mg  10 mg   • cloNIDine (CATAPRES) 0.1 MG/24HR 1 Patch  1 Patch   • digoxin (LANOXIN) tablet 250 mcg  250 mcg   • divalproex (DEPAKOTE) delayed-release tablet 250 mg  250 mg   • donepezil (ARICEPT) tablet 5 mg  5 mg   • escitalopram (LEXAPRO) tablet 20 mg  20 mg   • levothyroxine (SYNTHROID) tablet 25 mcg  25 mcg   • omeprazole (PRILOSEC) capsule 20 mg  20 mg   • QUEtiapine (SEROQUEL) tablet 100 mg  100 mg   • Influenza Vaccine High-Dose pf injection 0.5 mL  0.5 mL   • loperamide (IMODIUM) capsule 2 mg  2 mg   •  ALPRAZolam (XANAX) tablet 0.25 mg  0.25 mg         Current Outpatient Medications:  Prescriptions Prior to Admission   Medication Sig Dispense Refill Last Dose   • levothyroxine (SYNTHROID) 25 MCG Tab Take 25 mcg by mouth Every morning on an empty stomach.   1/15/2019 at 0800   • digoxin (LANOXIN) 250 MCG Tab Take 250 mcg by mouth every 48 hours.   1/14/2019 at 0800   • escitalopram (LEXAPRO) 20 MG tablet Take 20 mg by mouth every day.   1/15/2019 at 0800   • omeprazole (PRILOSEC) 20 MG delayed-release capsule Take 20 mg by mouth every day.   1/8/2019 at 2000   • Capsaicin (CAPZASIN-HP) 0.1 % Cream 1 Application by Apply externally route 4 times a day as needed.   1/15/2019 at 1200   • cloNIDine (CATAPRES) 0.1 MG/24HR PATCH WEEKLY Apply 1 Patch to skin as directed every 7 days.   1/10/19 at 0800   • divalproex (DEPAKOTE) 250 MG Tablet Delayed Response Take 250 mg by mouth every day.   1/14/2019 at 1600   • rivaroxaban (XARELTO) 20 MG Tab tablet Take 20 mg by mouth with dinner.   1/14/2019 at 1800   • QUEtiapine (SEROQUEL) 100 MG Tab Take 100 mg by mouth every bedtime.   1/8/2019 at 2000   • donepezil (ARICEPT) 5 MG Tab Take 5 mg by mouth every evening.   1/8/2019 at 2000       Medication Allergy:  Allergies   Allergen Reactions   • Pcn [Penicillins] Anaphylaxis       Family History:  Family History   Problem Relation Age of Onset   • Heart Disease Mother        Social History:  Social History     Social History   • Marital status:      Spouse name: N/A   • Number of children: N/A   • Years of education: N/A     Occupational History   • Not on file.     Social History Main Topics   • Smoking status: Former Smoker     Quit date: 2/2/2000   • Smokeless tobacco: Never Used      Comment: smoked for 35 yrs before he quit in 2000   • Alcohol use No   • Drug use: No   • Sexual activity: Not on file     Other Topics Concern   • Not on file     Social History Narrative   • No narrative on file         Physical  "Exam:  Vitals/ General Appearance:   Weight/BMI: Body mass index is 28.45 kg/m².  Blood pressure 130/78, pulse 78, temperature 36.4 °C (97.5 °F), temperature source Temporal, resp. rate 20, height 1.88 m (6' 2\"), weight 100.5 kg (221 lb 9 oz), SpO2 92 %.  Vitals:    01/18/19 0300 01/18/19 0400 01/18/19 0500 01/18/19 0600   BP:       Pulse: 63 67 69 78   Resp:  18 14 20   Temp:  36.4 °C (97.5 °F)     TempSrc:  Temporal     SpO2: 97% 96% 91% 92%   Weight:       Height:         Oxygen Therapy:  Pulse Oximetry: 92 %, O2 (LPM): 2, O2 Delivery: Oxymask    Constitutional:   Well developed, Well nourished, No acute distress  HENMT:  Normocephalic, Atraumatic, Oropharynx moist mucous membranes, No oral exudates, Nose normal.  No thyromegaly.  Eyes:  EOMI, Conjunctiva normal, No discharge.  Neck:  Normal range of motion, No cervical tenderness,  no JVD.  Cardiovascular:  Irregularly irregular, No murmurs, No rubs, No gallops.   Extremitites with intact distal pulses, no cyanosis, or edema.  Lungs:  Normal breath sounds, breath sounds clear to auscultation bilaterally,  no crackles, no wheezing.   Abdomen: Bowel sounds normal, Soft, No tenderness, No guarding, No rebound, No masses, No hepatosplenomegaly.  Skin: Warm, Dry, No erythema, No rash, no induration.  Neurologic: Alert & oriented x 3, No focal deficits noted, cranial nerves II through X are grossly intact.  Psychiatric: Affect normal, Judgment normal, Mood normal.      MDM (Data Review):     Records reviewed and summarized in current documentation    Lab Data Review:  Recent Results (from the past 24 hour(s))   EC-ECHOCARDIOGRAM COMPLETE W/ CONT    Collection Time: 01/17/19 11:23 AM   Result Value Ref Range    Eject.Frac. MOD 4C 46.36     Left Ventrical Ejection Fraction 45    EKG    Collection Time: 01/17/19  2:00 PM   Result Value Ref Range    Report       [facility]    Test Date:  2019-01-17  Pt Name:    BRIAN BRODY                    Department: 161  MRN:     "    [id number]                      Room:       T601  Gender:     Male                         Technician: [employer]  :        1940                   Requested By:SHAD HO  Order #:    [id number]                    Reading MD: Latoya Orozco    Measurements  Intervals                                Axis  Rate:       73                           P:  OR:                                      QRS:        -70  QRSD:       152                          T:          81  QT:         436  QTc:        481    Interpretive Statements  ATRIAL FIBRILLATION  RBBB AND LAFB  Compared to ECG 2019 13:12:45  No significant change    Electronically Signed On 2019 19:11:40 PST by Latoya Orozco     EKG    Collection Time: 19  4:16 PM   Result Value Ref Range    Report       [facility]    Test Date:  2019  Pt Name:    BRIAN BRODY                    Department: 161  MRN:        [id number]                      Room:       01  Gender:     Male                         Technician: [employer]  :        1940                   Requested By:SALOMON MONTES  Order #:    [id number]                    Reading MD: Latoya Orozco    Measurements  Intervals                                Axis  Rate:       70                           P:  OR:                                      QRS:        -65  QRSD:       152                          T:          87  QT:         448  QTc:        484    Interpretive Statements  ATRIAL FIBRILLATION  RBBB AND LAFB  Compared to ECG 2019 14:00:30  No significant changes    Electronically Signed On 2019 19:13:42 PST by Latoya Orozco     TROPONIN    Collection Time: 19  4:42 PM   Result Value Ref Range    Troponin I 53.92 (H) 0.00 - 0.04 ng/mL   EKG    Collection Time: 19  1:23 AM   Result Value Ref Range    Report       [facility]    Test Date:  2019  Pt Name:    BRIAN BRODY                    Department: 161  MRN:        [id number]                       Room:       [facility]  Gender:     Male                         Technician: SHAHLA  :        1940                   Requested By:GLORIA NEELY  Order #:    [id number]                    Reading MD:    Measurements  Intervals                                Axis  Rate:       70                           P:  OH:                                      QRS:        -81  QRSD:       154                          T:          70  QT:         428  QTc:        462    Interpretive Statements  ATRIAL FIBRILLATION  RIGHT BUNDLE BRANCH BLOCK  Compared to ECG 2019 16:16:03  Left anterior fascicular block no longer present     TROPONIN    Collection Time: 19  1:50 AM   Result Value Ref Range    Troponin I 43.56 (H) 0.00 - 0.04 ng/mL       Imaging/Procedures Review:    Chest Xray:  Reviewed    EKG:   Personally reviewed by myself showing small STEMI at I, AVL and V6      MDM (Assessment and Plan):     Active Hospital Problems    Diagnosis   • Liver masses [R16.0]   • Colonic mass [K63.9]   • Paroxysmal atrial fibrillation (HCC) [I48.0]   • Essential hypertension [I10]   • Precordial pain [R07.2]   • Alzheimer's disease [G30.9, F02.80]   • Recurrent major depressive disorder, in full remission (HCC) [F33.42]   • Hypothyroid [E03.9]   • GERD (gastroesophageal reflux disease) [K21.9]     Assessment / Plan:  #STEMI  #Atrial Fibrillation    #HTN  #Alzheimer's disease  #Hypothyroidism  #GERD  Reporting 2 day h/o of left shoulder 6/10 pain  Shoulder pain worsens with palpation  EKG showed STEMI at leads I, aVL and V6  Initial trop neg, f/u Trop in the 300s, peaked at 360.18, trending down   Cath showed total (likely chronic) LAD occlusion with slight blood surplice from collateral RCA branch. Nondominant LCA with 60% stenosis and RCA dominant.   ECHO showed Left ventricle is mildly dilated, mildly reduced left ventricular systolic function, EF at 40-45%, multiple wall motion abnormalities, mild MR/TR, and RVSP at  30.  Continue medical therapy  Started ACEi Therapy  Continue BB therapy w/Carvedilol  Continue High does statin therapy  Continue ASA  Continue Digoxin and Xarelto for afib      Cardiology will sign off    It is my pleasure to participate in the care of Mr. Melgar.  Please do not hesitate to contact us with questions or concerns.    Mert Cardenas  1/18/2019

## 2019-01-18 NOTE — PROGRESS NOTES
Hospital Medicine Daily Progress Note    Date of Service  1/18/2019    Chief Complaint  78 y.o. male admitted 1/15/2019 with CP    Hospital Course    CP x 2 days.  Initial Trop and EKG neg however after admission Trops increased >300 and transferred to CICU.  Went to cath lab on 1/16 with total occlusion of LAD noted.  No interventions, and medical management recomended      Interval Problem Update  ROS limited due to underlying dementia however denies all this am. C/o CP 6/10 overnight.  Trop and EKG benignt  AFib rate controled overnight  -120s  Tolerating po well    Consultants/Specialty  Cardiology    Code Status  full    Disposition  OK to Tele    Review of Systems  Review of Systems   Unable to perform ROS: Dementia   Cardiovascular: Positive for chest pain.        Physical Exam  Temp:  [36.1 °C (97 °F)-36.4 °C (97.5 °F)] 36.4 °C (97.5 °F)  Pulse:  [62-72] 67  Resp:  [15-18] 18  SpO2:  [89 %-100 %] 96 %    Physical Exam   Constitutional: He appears well-developed and well-nourished. No distress.   HENT:   Head: Normocephalic and atraumatic.   Eyes: Conjunctivae are normal.   Neck: No JVD present.   Cardiovascular: Normal rate.  Exam reveals no gallop.    Murmur heard.  Pulmonary/Chest: Effort normal. No stridor. No respiratory distress. He has no wheezes. He has no rales.   Abdominal: Soft. There is no tenderness. There is no rebound and no guarding.   Musculoskeletal: He exhibits no edema.   Neurological: He is alert.   O x 2 knows he's in the hospital but does not remember meeeting me yestserday or why he is in the Hospital   Skin: Skin is warm and dry. No rash noted. He is not diaphoretic.   Psychiatric: He has a normal mood and affect. Thought content normal.   Nursing note and vitals reviewed.      Fluids    Intake/Output Summary (Last 24 hours) at 01/18/19 0505  Last data filed at 01/18/19 0400   Gross per 24 hour   Intake              610 ml   Output              700 ml   Net              -90 ml        Laboratory  Recent Labs      01/15/19   1245   WBC  9.5   RBC  4.65*   HEMOGLOBIN  13.3*   HEMATOCRIT  40.7*   MCV  87.5   MCH  28.6   MCHC  32.7*   RDW  51.6*   PLATELETCT  360   MPV  9.7     Recent Labs      01/15/19   1245  01/16/19   0530  01/17/19   0554   SODIUM  138  138  136   POTASSIUM  3.7  4.0  4.2   CHLORIDE  104  106  103   CO2  24  21  25   GLUCOSE  117*  91  85   BUN  18  19  20   CREATININE  1.26  1.26  1.31   CALCIUM  8.9  9.6  9.4     Recent Labs      01/15/19   1245   APTT  26.5   INR  1.03     Recent Labs      01/15/19   1245   BNPBTYPENAT  163*     Recent Labs      01/16/19   1030   TRIGLYCERIDE  112   HDL  24*   LDL  45       Imaging  EC-ECHOCARDIOGRAM COMPLETE W/ CONT   Final Result      CT-CTA COMPLETE THORACOABDOMINAL AORTA   Final Result      No aortic aneurysm or dissection.      Mild to moderate stenosis of the celiac trunk. Mild stenosis of the SMA, left renal artery and DOC. Moderate stenosis of the proximal right renal artery.      Indeterminate hypodense hepatic lesions could represent metastatic disease. Further evaluation with hepatic protocol CT or MRI is recommended.      Focal short segment distention with wall thickening or mass involving the terminal ileum. Further evaluation can be obtained with colonoscopy or CT enterography.      Colonic diverticulosis.      Atherosclerotic plaque.      Mildly prominent right abdominal mesenteric lymph node is nonspecific.      Emphysematous changes.      Borderline prominent hilar lymph nodes are nonspecific.      Cardiomegaly. Coronary artery calcification.      Nonvisualization of the appendix.      Bilateral bronchial wall thickening can be seen in the setting of bronchitis.      Small hypodense left renal lesions are too small to characterize.               CT-HEAD W/O   Final Result      No acute intracranial abnormality is identified.      There are periventricular and subcortical white matter changes present.  This finding is  nonspecific and could be from previous small vessel ischemia, demyelination, or gliosis.      Mild atrophy.      Mild mucosal thickening in the right maxillary sinus.      DX-CHEST-LIMITED (1 VIEW)   Final Result      No acute cardiopulmonary process is identified.      Atherosclerotic plaque.            CT-ABDOMEN LIVER FOR HEPATIC MASS (CIRRHOSIS)    (Results Pending)        Assessment/Plan  * Precordial pain- (present on admission)   Assessment & Plan    NSTEMI  Total occlusion LAD  S/p cath with no intervention; recomending medical management  ASA  High dose statin  Coreg  Dig  Consider ACEI       Colonic mass   Assessment & Plan    Noted on CT: unclear if significant  Also noted possible hepatic masses  Checking CT hepatic protocol: if neg will need colonoscopy.     Liver masses- (present on admission)   Assessment & Plan    Unclear significance  Check CT hepatic protocol     Recurrent major depressive disorder, in full remission (HCC)- (present on admission)   Assessment & Plan    Continue Seroquel and Lexapro     Alzheimer's disease- (present on admission)   Assessment & Plan    Continue Aricept     Essential hypertension- (present on admission)   Assessment & Plan    Continue clonidine     Paroxysmal atrial fibrillation (HCC)- (present on admission)   Assessment & Plan    Continue digoxin and Xarelto     GERD (gastroesophageal reflux disease)- (present on admission)   Assessment & Plan    PPI     Hypothyroid- (present on admission)   Assessment & Plan    Continue Synthroid          VTE prophylaxis: heparin

## 2019-01-18 NOTE — PROGRESS NOTES
Around 0530 this morning pt pleasant and following all commands. Pt resting in bed with call light within reach, bed alarm on, sitter in room

## 2019-01-18 NOTE — PROGRESS NOTES
Lab called with critical result of Troponin 53.92 at 1808. Critical lab result read back to lab.   Physician NOT notified, per protocol, as previous critical result has already been reported and this result is trending down.

## 2019-01-18 NOTE — PROGRESS NOTES
At 1400, pt c/o dull chest pain. IV morphine given, EKG ordered. Dr. Rivera notified. EKG unremarkable at this time. Pt does not appear to be in any obvious distress, CPOT score 0. Oxycodone given at 1511 due to shoulder pain. Pt reports no increase or decrease in pain with activity, reports pain is lessened when laying on left side. At 1618, pt began reporting increase in chest pain while laying in bed.    Additional EKG ordered. Cardiology paged at 1627. Response received from APRN at 1636. No new orders at this time.    Weikert text sent to R Cardiology team by [facility]. Orders received to draw stat Troponin.

## 2019-01-18 NOTE — CARE PLAN
Problem: Psychosocial Needs:  Goal: Level of anxiety will decrease  Outcome: PROGRESSING AS EXPECTED  Pt continues to be forgetful. CNA sitter in pt room.    Problem: Mobility  Goal: Risk for activity intolerance will decrease  Outcome: PROGRESSING AS EXPECTED  Ambulated patient 100 feet down pete and back to room. Minimal assistance required, pt verbalizes when he is becoming tired.

## 2019-01-18 NOTE — CARE PLAN
Problem: Bowel/Gastric:  Goal: Will not experience complications related to bowel motility    Intervention: Assess baseline bowel pattern  Normoactive x4  Intervention: Implement interventions to promote bowel evacuation if inadequate bowel movements in past 48 hours  Senna, fluids, ambulation  Intervention: Implement Bowel Protocol, if applicable  Will implement if needed. Not currently needed.       Problem: Mobility  Goal: Risk for activity intolerance will decrease    Intervention: Assess and monitor signs of activity intolerance  Patient stating he is dizzy and feels weak. 2 assist to stand. Then one person assist when ambulating with front wheel walker  Intervention: Provide rest periods  Pt ambulated 10 feet into hallway then felt very dizzy. Pt sat in chair immediately and taken back to room.   Intervention: Encourage patient to increase activity level in collaboration with Interdisciplinary Team  Patient ambulating around pete and in room

## 2019-01-18 NOTE — PROGRESS NOTES
"Patient very agitated/confused entire shift. Pt cussing at nurses. Pt states \"Do not touch me.\"; therefor, nurse does not physically assist patient when standing (however is a stand by assist), then pt states\"Well if you aren't going to help me then I'm going to pee right here on the floor.\" AOx3; not oriented to time. Pt states it is the year \"3013\". Agitation worsened after 2mg morphine given for chest pain, which was a different reaction than when he had the morphine at 1400.     Pt reoriented as needed; sitter in room. Belongings at bedside. Call light within reach, bed alarm on.       "

## 2019-01-18 NOTE — PROGRESS NOTES
Monitor Summary     /0.12/  A-fib 50-80s  Pt heart rate went down to the 30s for three seconds followed by a 1.6 second pause.    12 hour chart check

## 2019-01-18 NOTE — PROGRESS NOTES
Cardiology Progress Note:      Date & Time note created:    1/16/2019   7:44 AM     Referring MD:  Dr. Ureña     Patient ID:   Name:             Paulie Melgar     YOB: 1940  Age:                 78 y.o.  male     MRN:               [id number]                                                             Reason for Consult:      STEMI     History of Present Illness:    77 yo M with PMH of a-fib, HTN presents tonight with chest pain described as a sharp, pressure like sensation in his chest rated a 5-6/10 starting yesterday.  His admitting ECG showed a posterior infarct pattern with a follow up ECG showing an evolving ECG pattern of a posterior STEMI.  His first two troponins were negative however his third was positive at 307.  He continues to have chest pain.  A code STEMI was activated and repeat ECG was obtained. EKG      Event Interval:       1/16/2019 Patient reporting 6/10 shoulder pain. Denies dyspnea, dizziness or palpitations. Patient still upset over anxiety during cath lab procedure which was held as patient was becoming combative. He was noted for a total (likely chronic) LAD occlusion with slight blood surplice from collateral RCA branch. Nondominant LCA with 60% stenosis and RCA dominant. Trops peaked at 360.18. Started medical therapy with ASA, BB therapy, and high does statin. Morphine for pain. Pending ECHO.          Past Medical History:   Past Medical History        Past Medical History:   Diagnosis Date   • Cancer (HCC) 1990     lymphoma and melanoma   • GERD (gastroesophageal reflux disease)     • Hodgkins disease (HCC) 2/2/2010   • Hypertension     • Hypothyroid 2/2/2010   • Insomnia 3/22/2010   • Melanoma (HCC) 2/2/2010   • Melanoma in situ of back (HCC) 2/2/2010   • Neuropathy (HCC) 2/11/2010   • Syncopal episodes 2/2/2010   • Thyroid disease       hypothyroidism             Active Hospital Problems     Diagnosis   • Paroxysmal atrial fibrillation (HCC) [I48.0]   •   Rule out  thoracic or abdominal aortic aneurysm (AAA) without rupture (HCC) [I71.4]   • Essential hypertension [I10]   • Precordial pain [R07.2]   • Alzheimer's disease [G30.9, F02.80]   • Recurrent major depressive disorder, in full remission (HCC) [F33.42]   • Hypothyroid [E03.9]   • GERD (gastroesophageal reflux disease) [K21.9]         Past Surgical History:  Past Surgical History         Past Surgical History:   Procedure Laterality Date   • ABDOMINAL EXPLORATION         for hodgkins lymphoma   • APPENDECTOMY                Hospital Medications:       Current Facility-Administered Medications   Medication Dose   • morphine (pf) 4 mg/ml injection 2 mg  2 mg     Or   • morphine (pf) 4 mg/ml injection 4 mg  4 mg   • NS infusion     • polyethylene glycol/lytes (MIRALAX) PACKET 1 Packet  1 Packet     And   • magnesium hydroxide (MILK OF MAGNESIA) suspension 30 mL  30 mL     And   • bisacodyl (DULCOLAX) suppository 10 mg  10 mg   • [START ON 1/17/2019] cloNIDine (CATAPRES) 0.1 MG/24HR 1 Patch  1 Patch   • digoxin (LANOXIN) tablet 250 mcg  250 mcg   • divalproex (DEPAKOTE) delayed-release tablet 250 mg  250 mg   • donepezil (ARICEPT) tablet 5 mg  5 mg   • escitalopram (LEXAPRO) tablet 20 mg  20 mg   • levothyroxine (SYNTHROID) tablet 25 mcg  25 mcg   • omeprazole (PRILOSEC) capsule 20 mg  20 mg   • QUEtiapine (SEROQUEL) tablet 100 mg  100 mg   • rivaroxaban (XARELTO) tablet 20 mg  20 mg   • Influenza Vaccine High-Dose pf injection 0.5 mL  0.5 mL   • loperamide (IMODIUM) capsule 2 mg  2 mg   • ALPRAZolam (XANAX) tablet 0.25 mg  0.25 mg            Current Outpatient Medications:  Prescriptions Prior to Admission           Prescriptions Prior to Admission   Medication Sig Dispense Refill Last Dose   • levothyroxine (SYNTHROID) 25 MCG Tab Take 25 mcg by mouth Every morning on an empty stomach.     1/15/2019 at 0800   • digoxin (LANOXIN) 250 MCG Tab Take 250 mcg by mouth every 48 hours.     1/14/2019 at 0800   • escitalopram  "(LEXAPRO) 20 MG tablet Take 20 mg by mouth every day.     1/15/2019 at 0800   • omeprazole (PRILOSEC) 20 MG delayed-release capsule Take 20 mg by mouth every day.     1/8/2019 at 2000   • Capsaicin (CAPZASIN-HP) 0.1 % Cream 1 Application by Apply externally route 4 times a day as needed.     1/15/2019 at 1200   • cloNIDine (CATAPRES) 0.1 MG/24HR PATCH WEEKLY Apply 1 Patch to skin as directed every 7 days.     1/10/19 at 0800   • divalproex (DEPAKOTE) 250 MG Tablet Delayed Response Take 250 mg by mouth every day.     1/14/2019 at 1600   • rivaroxaban (XARELTO) 20 MG Tab tablet Take 20 mg by mouth with dinner.     1/14/2019 at 1800   • QUEtiapine (SEROQUEL) 100 MG Tab Take 100 mg by mouth every bedtime.     1/8/2019 at 2000   • donepezil (ARICEPT) 5 MG Tab Take 5 mg by mouth every evening.     1/8/2019 at 2000            Medication Allergy:       Allergies   Allergen Reactions   • Pcn [Penicillins] Anaphylaxis         Family History:  Family History         Family History   Problem Relation Age of Onset   • Heart Disease Mother              Social History:  Social History   Social History            Social History   • Marital status:        Spouse name: N/A   • Number of children: N/A   • Years of education: N/A          Occupational History   • Not on file.             Social History Main Topics   • Smoking status: Former Smoker       Quit date: 2/2/2000   • Smokeless tobacco: Never Used         Comment: smoked for 35 yrs before he quit in 2000   • Alcohol use No   • Drug use: No   • Sexual activity: Not on file           Other Topics Concern   • Not on file      Social History Narrative   • No narrative on file               Physical Exam:  Vitals/ General Appearance:   Weight/BMI: Body mass index is 28.45 kg/m².  Blood pressure 130/78, pulse 83, temperature 36.7 °C (98 °F), temperature source Temporal, resp. rate (!) 24, height 1.88 m (6' 2\"), weight 100.5 kg (221 lb 9 oz), SpO2 96 %.  Vitals        "   Vitals:     01/15/19 2208 01/16/19 0000 01/16/19 0127 01/16/19 0133   BP: 134/78 148/65 143/86 130/78   Pulse: 98 86 83 83   Resp: (!) 22 (!) 26 (!) 26 (!) 24   Temp: 36.1 °C (97 °F) 36.7 °C (98 °F)       TempSrc: Temporal Temporal       SpO2: 95% 96% 96% 96%   Weight:           Height:                 Oxygen Therapy:  Pulse Oximetry: 96 %, O2 (LPM): 0, O2 Delivery: None (Room Air)     Constitutional:   Well developed, Well nourished, No acute distress  HENMT:  Normocephalic, Atraumatic, Oropharynx moist mucous membranes, No oral exudates, Nose normal.  No thyromegaly.  Eyes:  EOMI, Conjunctiva normal, No discharge.  Neck:  Normal range of motion, No cervical tenderness,  no JVD.  Cardiovascular:  Irregularly irregular, No murmurs, No rubs, No gallops.   Extremitites with intact distal pulses, no cyanosis, or edema.  Lungs:  Normal breath sounds, breath sounds clear to auscultation bilaterally,  no crackles, no wheezing.   Abdomen: Bowel sounds normal, Soft, No tenderness, No guarding, No rebound, No masses, No hepatosplenomegaly.  Skin: Warm, Dry, No erythema, No rash, no induration.  Neurologic: Alert & oriented x 3, No focal deficits noted, cranial nerves II through X are grossly intact.  Psychiatric: Affect normal, Judgment normal, Mood normal.        MDM (Data Review):     Records reviewed and summarized in current documentation     Lab Data Review:  Recent Results          Recent Results (from the past 24 hour(s))   Troponin     Collection Time: 01/15/19 12:45 PM   Result Value Ref Range     Troponin I 0.05 (H) 0.00 - 0.04 ng/mL   Btype Natriuretic Peptide     Collection Time: 01/15/19 12:45 PM   Result Value Ref Range     B Natriuretic Peptide 163 (H) 0 - 100 pg/mL   CBC with Differential     Collection Time: 01/15/19 12:45 PM   Result Value Ref Range     WBC 9.5 4.8 - 10.8 K/uL     RBC 4.65 (L) 4.70 - 6.10 M/uL     Hemoglobin 13.3 (L) 14.0 - 18.0 g/dL     Hematocrit 40.7 (L) 42.0 - 52.0 %     MCV 87.5 81.4  - 97.8 fL     MCH 28.6 27.0 - 33.0 pg     MCHC 32.7 (L) 33.7 - 35.3 g/dL     RDW 51.6 (H) 35.9 - 50.0 fL     Platelet Count 360 164 - 446 K/uL     MPV 9.7 9.0 - 12.9 fL     Neutrophils-Polys 79.90 (H) 44.00 - 72.00 %     Lymphocytes 12.80 (L) 22.00 - 41.00 %     Monocytes 5.10 0.00 - 13.40 %     Eosinophils 1.60 0.00 - 6.90 %     Basophils 0.10 0.00 - 1.80 %     Immature Granulocytes 0.50 0.00 - 0.90 %     Nucleated RBC 0.00 /100 WBC     Neutrophils (Absolute) 7.60 (H) 1.82 - 7.42 K/uL     Lymphs (Absolute) 1.22 1.00 - 4.80 K/uL     Monos (Absolute) 0.49 0.00 - 0.85 K/uL     Eos (Absolute) 0.15 0.00 - 0.51 K/uL     Baso (Absolute) 0.01 0.00 - 0.12 K/uL     Immature Granulocytes (abs) 0.05 0.00 - 0.11 K/uL     NRBC (Absolute) 0.00 K/uL   Complete Metabolic Panel (CMP)     Collection Time: 01/15/19 12:45 PM   Result Value Ref Range     Sodium 138 135 - 145 mmol/L     Potassium 3.7 3.6 - 5.5 mmol/L     Chloride 104 96 - 112 mmol/L     Co2 24 20 - 33 mmol/L     Anion Gap 10.0 0.0 - 11.9     Glucose 117 (H) 65 - 99 mg/dL     Bun 18 8 - 22 mg/dL     Creatinine 1.26 0.50 - 1.40 mg/dL     Calcium 8.9 8.5 - 10.5 mg/dL     AST(SGOT) 73 (H) 12 - 45 U/L     ALT(SGPT) 12 2 - 50 U/L     Alkaline Phosphatase 74 30 - 99 U/L     Total Bilirubin 0.7 0.1 - 1.5 mg/dL     Albumin 3.6 3.2 - 4.9 g/dL     Total Protein 6.3 6.0 - 8.2 g/dL     Globulin 2.7 1.9 - 3.5 g/dL     A-G Ratio 1.3 g/dL   Prothrombin Time     Collection Time: 01/15/19 12:45 PM   Result Value Ref Range     PT 13.6 12.0 - 14.6 sec     INR 1.03 0.87 - 1.13   APTT     Collection Time: 01/15/19 12:45 PM   Result Value Ref Range     APTT 26.5 24.7 - 36.0 sec   Lipase     Collection Time: 01/15/19 12:45 PM   Result Value Ref Range     Lipase 27 11 - 82 U/L   ESTIMATED GFR     Collection Time: 01/15/19 12:45 PM   Result Value Ref Range     GFR If African American >60 >60 mL/min/1.73 m 2     GFR If Non African American 55 (A) >60 mL/min/1.73 m 2   EKG     Collection Time:  01/15/19 12:45 PM   Result Value Ref Range     Report           [facility]     Test Date:  2019-01-15  Pt Name:    BRIAN BRODY                    Department: ER  MRN:        [id number]                      Room:       RD 03  Gender:     Male                         Technician: [id number]  :        1940                   Requested By:ER TRIAGE PROTOCOL  Order #:    [id number]                    Reading MD: ROSIO STRAUSS MD     Measurements  Intervals                                Axis  Rate:       60                           P:          0  VA:         194                          QRS:        -64  QRSD:       160                          T:          5  QT:         488  QTc:        488     Interpretive Statements  SINUS RHYTHM  RBBB AND LAFB  No previous ECG available for comparison     Electronically Signed On 1- 13:01:43 PST by ROSIO STRAUSS MD      TROPONIN     Collection Time: 01/15/19 10:12 PM   Result Value Ref Range     Troponin I 307.36 (H) 0.00 - 0.04 ng/mL   EKG     Collection Time: 01/15/19 10:16 PM   Result Value Ref Range     Report           [facility]     Test Date:  2019-01-15  Pt Name:    BRIAN BRODY                    Department: 171  MRN:        [id number]                      Room:       T703  Gender:     Male                         Technician: [id number]  :        1940                   Requested By:DORENE PATEL  Order #:    [id number]                    Reading MD: Gee Warren MD     Measurements  Intervals                                Axis  Rate:       101                          P:  VA:                                      QRS:        -70  QRSD:       142                          T:          59  QT:         384  QTc:        498     Interpretive Statements  ATRIAL FIBRILLATION, V-RATE    RBBB AND LAFB  BASELINE WANDER IN LEAD(S) III,V4  Compared to ECG 01/15/2019 12:45:53  Sinus rhythm no longer present     Electronically Signed On  2019 0:18:42 PST by Gee Warren MD      TROPONIN     Collection Time: 01/15/19 11:55 PM   Result Value Ref Range     Troponin I 360.18 (H) 0.00 - 0.04 ng/mL   EKG     Collection Time: 19  1:33 AM   Result Value Ref Range     Report           [facility]     Test Date:  2019  Pt Name:    BRIAN BRODY                    Department: 171  MRN:        [id number]                      Room:       T703  Gender:     Male                         Technician: SHAHLA  :        1940                   Requested By:SOFIE ANNA  Order #:    [id number]                    Reading MD:     Measurements  Intervals                                Axis  Rate:       79                           P:  FL:                                      QRS:        -65  QRSD:       152                          T:          49  QT:         424  QTc:        487     Interpretive Statements  ATRIAL FIBRILLATION  RBBB AND LAFB  Compared to ECG 01/15/2019 22:16:49  No significant changes               Imaging/Procedures Review:    Chest Xray:  Reviewed     EKG:   Personally reviewed by myself showing small STEMI at I, AVL and V6     ECHO:  Pending     MDM (Assessment and Plan):         Active Hospital Problems     Diagnosis   • Paroxysmal atrial fibrillation (HCC) [I48.0]   •   Rule out thoracic or abdominal aortic aneurysm (AAA) without rupture (HCC) [I71.4]   • Essential hypertension [I10]   • Precordial pain [R07.2]   • Alzheimer's disease [G30.9, F02.80]   • Recurrent major depressive disorder, in full remission (HCC) [F33.42]   • Hypothyroid [E03.9]   • GERD (gastroesophageal reflux disease) [K21.9]      Assessment / Plan:  #STEMI  #Atrial Fibrillation    #HTN  #Alzheimer's disease  #Hypothyroidism  #GERD  Reporting 2 day h/o of left shoulder 6/10 pain  Shoulder pain worsens with palpation  EKG showed STEMI at leads I, aVL and V6  Initial trop neg, f/u Trop in the 300s, peaked at 360.18, trending down   Cath showed total  (likely chronic) LAD occlusion with slight blood surplice from collateral RCA branch. Nondominant LCA with 60% stenosis and RCA dominant.   Started medical therapy  Started BB therapy w/Carvedilol  Started High does statin therapy  Continue ASA  Continue Digoxin and Xarelto for afib  Pending ECHO  Labs on AM        It is my pleasure to participate in the care of Mrs. Melgar.  Please do not hesitate to contact us with questions or concerns.     Mert Cardenas  1/16/2019

## 2019-01-18 NOTE — PROGRESS NOTES
"Hospital Medicine Daily Progress Note    Date of Service  1/17/2019    Chief Complaint  78 y.o. male admitted 1/15/2019 with CP    Hospital Course    CP x 2 days.  Initial Trop and EKG neg however after admission Trops increased >300 and transferred to CICU.  Went to cath lab on 1/16 with total occlusion of LAD noted.  No interventions, and medical management recomended      Interval Problem Update  ROS limited due to underlying dementia however denies all this am.  \"I feel fine but then I forget things\"    Consultants/Specialty  Cardiology    Code Status  full    Disposition  OK to Tele    Review of Systems  Review of Systems   Unable to perform ROS: Dementia   Cardiovascular: Positive for chest pain.        Physical Exam  Temp:  [36.1 °C (97 °F)] 36.1 °C (97 °F)  Pulse:  [58-76] 66  Resp:  [15-22] 18  SpO2:  [93 %-100 %] 98 %    Physical Exam   Constitutional: He appears well-developed and well-nourished. No distress.   HENT:   Head: Normocephalic and atraumatic.   Eyes: Conjunctivae are normal.   Neck: No JVD present.   Cardiovascular: Normal rate.  Exam reveals no gallop.    Murmur heard.  Pulmonary/Chest: Effort normal. No stridor. No respiratory distress. He has no wheezes. He has no rales.   Abdominal: Soft. There is no tenderness. There is no rebound and no guarding.   Musculoskeletal: He exhibits no edema.   Neurological: He is alert.   O x 2 knows he's in the hospital but does not remember meeeting me yestserday or why he is in the Hospital   Skin: Skin is warm and dry. No rash noted. He is not diaphoretic.   Psychiatric: He has a normal mood and affect. Thought content normal.   Nursing note and vitals reviewed.      Fluids    Intake/Output Summary (Last 24 hours) at 01/17/19 1756  Last data filed at 01/17/19 1200   Gross per 24 hour   Intake              240 ml   Output             1850 ml   Net            -1610 ml       Laboratory  Recent Labs      01/15/19   1245   WBC  9.5   RBC  4.65*   HEMOGLOBIN  " 13.3*   HEMATOCRIT  40.7*   MCV  87.5   MCH  28.6   MCHC  32.7*   RDW  51.6*   PLATELETCT  360   MPV  9.7     Recent Labs      01/15/19   1245  01/16/19   0530  01/17/19   0554   SODIUM  138  138  136   POTASSIUM  3.7  4.0  4.2   CHLORIDE  104  106  103   CO2  24  21  25   GLUCOSE  117*  91  85   BUN  18  19  20   CREATININE  1.26  1.26  1.31   CALCIUM  8.9  9.6  9.4     Recent Labs      01/15/19   1245   APTT  26.5   INR  1.03     Recent Labs      01/15/19   1245   BNPBTYPENAT  163*     Recent Labs      01/16/19   1030   TRIGLYCERIDE  112   HDL  24*   LDL  45       Imaging  EC-ECHOCARDIOGRAM COMPLETE W/ CONT   Final Result      CT-CTA COMPLETE THORACOABDOMINAL AORTA   Final Result      No aortic aneurysm or dissection.      Mild to moderate stenosis of the celiac trunk. Mild stenosis of the SMA, left renal artery and DOC. Moderate stenosis of the proximal right renal artery.      Indeterminate hypodense hepatic lesions could represent metastatic disease. Further evaluation with hepatic protocol CT or MRI is recommended.      Focal short segment distention with wall thickening or mass involving the terminal ileum. Further evaluation can be obtained with colonoscopy or CT enterography.      Colonic diverticulosis.      Atherosclerotic plaque.      Mildly prominent right abdominal mesenteric lymph node is nonspecific.      Emphysematous changes.      Borderline prominent hilar lymph nodes are nonspecific.      Cardiomegaly. Coronary artery calcification.      Nonvisualization of the appendix.      Bilateral bronchial wall thickening can be seen in the setting of bronchitis.      Small hypodense left renal lesions are too small to characterize.               CT-HEAD W/O   Final Result      No acute intracranial abnormality is identified.      There are periventricular and subcortical white matter changes present.  This finding is nonspecific and could be from previous small vessel ischemia, demyelination, or gliosis.       Mild atrophy.      Mild mucosal thickening in the right maxillary sinus.      DX-CHEST-LIMITED (1 VIEW)   Final Result      No acute cardiopulmonary process is identified.      Atherosclerotic plaque.            CT-ABDOMEN LIVER FOR HEPATIC MASS (CIRRHOSIS)    (Results Pending)        Assessment/Plan  * Precordial pain- (present on admission)   Assessment & Plan    NSTEMI  Total occlusion LAD  S/p cath with no intervention; recomending medical management  ASA  High dose statin  Coreg  Dig  Consider ACEI       Colonic mass   Assessment & Plan    Noted on CT: unclear if significant  Also noted possible hepatic masses  Checking CT hepatic protocol: if neg will need colonoscopy.     Liver masses- (present on admission)   Assessment & Plan    Unclear significance  Check CT hepatic protocol     Recurrent major depressive disorder, in full remission (HCC)- (present on admission)   Assessment & Plan    Continue Seroquel and Lexapro     Alzheimer's disease- (present on admission)   Assessment & Plan    Continue Aricept     Essential hypertension- (present on admission)   Assessment & Plan    Continue clonidine     Paroxysmal atrial fibrillation (HCC)- (present on admission)   Assessment & Plan    Continue digoxin and Xarelto     GERD (gastroesophageal reflux disease)- (present on admission)   Assessment & Plan    PPI     Hypothyroid- (present on admission)   Assessment & Plan    Continue Synthroid          VTE prophylaxis: heparin

## 2019-01-19 NOTE — DISCHARGE SUMMARY
Discharge Summary    CHIEF COMPLAINT ON ADMISSION  Chief Complaint   Patient presents with   • Chest Pain     patient reports onset of mid chest pain radiating to right arm.       Reason for Admission  EMS     Admission Date  1/15/2019    CODE STATUS  DNAR/DNI    HPI & HOSPITAL COURSE  This is a 78 y.o. male here for evaluation of chest pain.    This is a 78-year-old gentleman who has a known history of dementia and is a resident of [facility].  He presented on January 5 for evaluation of chest pain which he rated as a 6 out of 10 primarily in the left shoulder.  His initial EKG was equivocal and troponins were negative.  He was admitted to telemetry.  Subsequent troponin climbed to 200 and he was transferred to the CICU with a diagnosis of an STEMI.    Patient was taken to the Cath Lab on January 16 and noted to have a complete occlusion of the mid LAD.  He had an ejection fraction of 30%.  There was also evidence of pulmonary hypertension.  The culprit lesion was unable to be addressed.    At this point in time the patient's coronary artery disease will be managed medically, as he is not an interventional candidate.  It is likely he will continue to have intermittent chest pain at different times, as we were unable to fix his culprit lesion.  He has however done well with medication changes and has had minimal symptoms since.    As part of his cardiac workup, the patient had a CT of the chest.  This documented multiple hepatic lesions.  This led to CT of the abdomen demonstrating a colonic mass.  A dedicated CT of the liver was completed, and this is confirmed metastatic colon cancer.    I had a conversation with the patient's daughter Eloisa on the day of discharge.  I reviewed with her the diagnosis of metastatic colon cancer.  She is stated to me that she feels her dad would not want to go through chemo or surgeries or any other interventions.  Additionally given his cardiac condition and functional status, I  think he would not tolerate these interventions are with a be recommended even if we did pursue complete workup.  Therefore, he is discharged in fair and stable condition to home with close outpatient follow-up.    The patient met 2-midnight criteria for an inpatient stay at the time of discharge.    Discharge Date  1/18/19    FOLLOW UP ITEMS POST DISCHARGE  With Cardiology    DISCHARGE DIAGNOSES  Metastatic Colon Cancer  Dementia  Coronary aretery disease  NSTEMI  Hypertension  Depression  Hypothyroid  GERD     FOLLOW UP  No future appointments.  No follow-up provider specified.    MEDICATIONS ON DISCHARGE     Medication List      START taking these medications      Instructions   aspirin 81 MG EC tablet  Start taking on:  1/19/2019   Take 1 Tab by mouth every day.  Dose:  81 mg     atorvastatin 80 MG tablet  Commonly known as:  LIPITOR   Take 1 Tab by mouth every evening.  Dose:  80 mg     carvedilol 3.125 MG Tabs  Commonly known as:  COREG   Take 1 Tab by mouth 2 times a day, with meals.  Dose:  3.125 mg     lisinopril 2.5 MG Tabs  Commonly known as:  PRINIVIL   Take 1 Tab by mouth every bedtime.  Dose:  2.5 mg        CONTINUE taking these medications      Instructions   CAPZASIN-HP 0.1 % Crea  Generic drug:  Capsaicin   1 Application by Apply externally route 4 times a day as needed.  Dose:  1 Application     cloNIDine 0.1 MG/24HR Ptwk  Commonly known as:  CATAPRES   Apply 1 Patch to skin as directed every 7 days.  Dose:  1 Patch     digoxin 250 MCG Tabs  Commonly known as:  LANOXIN   Take 250 mcg by mouth every 48 hours.  Dose:  250 mcg     divalproex 250 MG Tbec  Commonly known as:  DEPAKOTE   Take 250 mg by mouth every day.  Dose:  250 mg     donepezil 5 MG Tabs  Commonly known as:  ARICEPT   Take 5 mg by mouth every evening.  Dose:  5 mg     escitalopram 20 MG tablet  Commonly known as:  LEXAPRO   Take 20 mg by mouth every day.  Dose:  20 mg     levothyroxine 25 MCG Tabs  Commonly known as:  SYNTHROID   Take  25 mcg by mouth Every morning on an empty stomach.  Dose:  25 mcg     omeprazole 20 MG delayed-release capsule  Commonly known as:  PRILOSEC   Take 20 mg by mouth every day.  Dose:  20 mg     QUEtiapine 100 MG Tabs  Commonly known as:  SEROQUEL   Take 100 mg by mouth every bedtime.  Dose:  100 mg     XARELTO 20 MG Tabs tablet  Generic drug:  rivaroxaban   Take 20 mg by mouth with dinner.  Dose:  20 mg            Allergies  Allergies   Allergen Reactions   • Pcn [Penicillins] Anaphylaxis       DIET  Orders Placed This Encounter   Procedures   • Diet Order Cardiac     Standing Status:   Standing     Number of Occurrences:   1     Order Specific Question:   Diet:     Answer:   Cardiac [6]       ACTIVITY  As tolerated.  Weight bearing as tolerated    CONSULTATIONS  cardiology    PROCEDURES  Cardiac Cath    LABORATORY  Lab Results   Component Value Date    SODIUM 138 01/18/2019    POTASSIUM 4.3 01/18/2019    CHLORIDE 106 01/18/2019    CO2 17 (L) 01/18/2019    GLUCOSE 92 01/18/2019    BUN 23 (H) 01/18/2019    CREATININE 1.38 01/18/2019    CREATININE 1.28 (H) 02/02/2010    GLOMRATE 56 (L) 02/02/2010        Lab Results   Component Value Date    WBC 9.5 01/15/2019    WBC 7.6 02/02/2010    HEMOGLOBIN 13.3 (L) 01/15/2019    HEMATOCRIT 40.7 (L) 01/15/2019    PLATELETCT 360 01/15/2019        Total time of the discharge process exceeds 45 minutes.  The great majority of that time was spent with the patient and his daughter Eloisa reviewing results of studies and treatment plan and prognosis.  I sat with Eloisa and her dad on 2 different occasions during the course of the day.

## 2019-01-19 NOTE — PROGRESS NOTES
Pt discharged with Daughter. Picture of photo ID uploaded to chart. Daughter will take pt to [facility] to have prescriptions filled. Pt toileted prior to discharge. IV's removed.

## 2019-01-19 NOTE — PROGRESS NOTES
LSW called - [facility] transport to [facility] confirmed for 1700    --ADDENDUM--    Pt daughter called, requested to  patient and bring to [facility] herself. This RN called ER LSW to cancel [facility] transport services.

## 2019-01-19 NOTE — DISCHARGE INSTRUCTIONS
Discharge Instructions    Discharged to group home by car with relative. Discharged via wheelchair, hospital escort: Yes.  Special equipment needed: Not Applicable    Be sure to schedule a follow-up appointment with your primary care doctor or any specialists as instructed.     Discharge Plan:   Diet Plan: Discussed  Activity Level: Discussed  Confirmed Follow up Appointment: Patient to Call and Schedule Appointment  Confirmed Symptoms Management: Discussed  Medication Reconciliation Updated: Yes  Pneumococcal Vaccine Administered/Refused: Not given - Patient refused pneumococcal vaccine  Influenza Vaccine Indication: Indicated: 65 years and older    I understand that a diet low in cholesterol, fat, and sodium is recommended for good health. Unless I have been given specific instructions below for another diet, I accept this instruction as my diet prescription.     Special Instructions: Diagnosis:  Acute Coronary Syndrome (ACS) is a diagnosis that encompasses cardiac-related chest pain and heart attack. ACS occurs when the blood flow to the heart muscle is severely reduced or cut off completely due to a slow process called atherosclerosis.  Atherosclerosis is a disease in which the coronary arteries become narrow from a buildup of fat, cholesterol, and other substances that combine to form plaque. If the plaque breaks, a blood clot will form and block the blood flow to the heart muscle. This lack of blood flow can cause damage or death to the heart muscle which is called a heart attack or Myocardial Infarction (MI). There are two different types of MIs:  ST Elevation Myocardial Infarction or STEMI (the most severe type of heart attack) and Non-ST Elevation Myocardial Infarction or NSTEMI.    Treatment Plan:  · Cardiac Diet  - Low fat, low salt, low cholesterol   · Cardiac Rehab  - Your doctor has ordered you a referral to [facility].  Call [phone] to schedule an appointment.  · Attend my follow-up appointment  with my Cardiologist.  · Take my medications as prescribed by my doctor  · Exercise daily  · Quit Smoking, Lower my bad cholesterol and raise my good cholesterol and Reduce stress    Medications:  Certain medications are used to treat ACS.  Remember to always take medications as prescribed and never stop talking medications unless told by your doctor.    You have been prescribed the following medicatons:    Aspirin - Aspirin is used as a blood thinning medication and you will require this medication indefinitely.  Anti-platelet/blood thinner - Your Anti-platelet/Blood thinning medication is called Rivaroxiban, and is used in combination with aspirin to prevent clots from forming in your heart and/or around your stent.  Your doctor will determine how long you need to be on this medicine.  Beta-Blocker - Beta-Blocker Carvedilol is used to lower blood pressure and heart rate, and/or helps your heart heal after a heart attack.  Statin - Statin Atorvastatin is used to lower cholesterol.  Angiotensin Converting Enzyme (ACE) Inhibitor - Angiotensin Converting Enzyme Inhibitor Lisinopril is used to lower blood pressure and treat heart failure.    · Is patient discharged on Warfarin / Coumadin?   No     Depression / Suicide Risk    As you are discharged from this [facility] facility, it is important to learn how to keep safe from harming yourself.    Recognize the warning signs:  · Abrupt changes in personality, positive or negative- including increase in energy   · Giving away possessions  · Change in eating patterns- significant weight changes-  positive or negative  · Change in sleeping patterns- unable to sleep or sleeping all the time   · Unwillingness or inability to communicate  · Depression  · Unusual sadness, discouragement and loneliness  · Talk of wanting to die  · Neglect of personal appearance   · Rebelliousness- reckless behavior  · Withdrawal from people/activities they love  · Confusion- inability to  concentrate     If you or a loved one observes any of these behaviors or has concerns about self-harm, here's what you can do:  · Talk about it- your feelings and reasons for harming yourself  · Remove any means that you might use to hurt yourself (examples: pills, rope, extension cords, firearm)  · Get professional help from the community (Mental Health, Substance Abuse, psychological counseling)  · Do not be alone:Call your Safe Contact- someone whom you trust who will be there for you.  · Call your local CRISIS HOTLINE 997-0034 or 988-841-6767  · Call your local Children's Mobile Crisis Response Team [place] (055) 132-3915 or www.[employer]  · Call the toll free National Suicide Prevention Hotlines   · National Suicide Prevention Lifeline 286-033-PJHB (3046)  · National Hope Line Network 800-SUICIDE (184-3273)

## 2019-01-20 ENCOUNTER — TELEPHONE (OUTPATIENT)
Dept: CARDIOLOGY | Facility: MEDICAL CENTER | Age: 79
End: 2019-01-20

## 2019-01-21 NOTE — TELEPHONE ENCOUNTER
Called by [facility], pt sent from facility with CP, testing there as expected trop 20 abnormal EKG     Given all comorbidities and prior goals of care advised continued hospice no benefit to aggressive interventions    It is my pleasure to participate in the care of Mr. Melgar.  Please do not hesitate to contact me with questions or concerns.    Gee Cruz MD PhD FAC  Cardiologist [facility]

## 2019-01-30 NOTE — ADDENDUM NOTE
Encounter addended by: Tejas Marrero D.O. on: 1/29/2019  5:28 PM<BR>    Actions taken: Sign clinical note

## 2019-02-08 ENCOUNTER — APPOINTMENT (OUTPATIENT)
Dept: RADIOLOGY | Facility: MEDICAL CENTER | Age: 79
End: 2019-02-08
Attending: EMERGENCY MEDICINE
Payer: MEDICARE

## 2019-02-08 ENCOUNTER — HOSPITAL ENCOUNTER (EMERGENCY)
Facility: MEDICAL CENTER | Age: 79
End: 2019-02-09
Attending: EMERGENCY MEDICINE
Payer: MEDICARE
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
## 2019-02-08 DIAGNOSIS — S20.212A CONTUSION OF LEFT CHEST WALL, INITIAL ENCOUNTER: ICD-10-CM

## 2019-02-08 LAB
BASOPHILS # BLD AUTO: 0.1 % (ref 0–1.8)
BASOPHILS # BLD: 0.01 K/UL (ref 0–0.12)
EOSINOPHIL # BLD AUTO: 0.14 K/UL (ref 0–0.51)
EOSINOPHIL NFR BLD: 1.3 % (ref 0–6.9)
ERYTHROCYTE [DISTWIDTH] IN BLOOD BY AUTOMATED COUNT: 52.8 FL (ref 35.9–50)
HCT VFR BLD AUTO: 40 % (ref 42–52)
HGB BLD-MCNC: 12.7 G/DL (ref 14–18)
IMM GRANULOCYTES # BLD AUTO: 0.04 K/UL (ref 0–0.11)
IMM GRANULOCYTES NFR BLD AUTO: 0.4 % (ref 0–0.9)
LYMPHOCYTES # BLD AUTO: 1.33 K/UL (ref 1–4.8)
LYMPHOCYTES NFR BLD: 12.6 % (ref 22–41)
MCH RBC QN AUTO: 28 PG (ref 27–33)
MCHC RBC AUTO-ENTMCNC: 31.8 G/DL (ref 33.7–35.3)
MCV RBC AUTO: 88.1 FL (ref 81.4–97.8)
MONOCYTES # BLD AUTO: 0.54 K/UL (ref 0–0.85)
MONOCYTES NFR BLD AUTO: 5.1 % (ref 0–13.4)
NEUTROPHILS # BLD AUTO: 8.5 K/UL (ref 1.82–7.42)
NEUTROPHILS NFR BLD: 80.5 % (ref 44–72)
NRBC # BLD AUTO: 0 K/UL
NRBC BLD-RTO: 0 /100 WBC
PLATELET # BLD AUTO: 318 K/UL (ref 164–446)
PMV BLD AUTO: 9.5 FL (ref 9–12.9)
RBC # BLD AUTO: 4.54 M/UL (ref 4.7–6.1)
WBC # BLD AUTO: 10.6 K/UL (ref 4.8–10.8)

## 2019-02-08 PROCEDURE — 84484 ASSAY OF TROPONIN QUANT: CPT

## 2019-02-08 PROCEDURE — 71045 X-RAY EXAM CHEST 1 VIEW: CPT

## 2019-02-08 PROCEDURE — 99285 EMERGENCY DEPT VISIT HI MDM: CPT

## 2019-02-08 PROCEDURE — 304561 HCHG STAT O2

## 2019-02-08 PROCEDURE — 80053 COMPREHEN METABOLIC PANEL: CPT

## 2019-02-08 PROCEDURE — 93005 ELECTROCARDIOGRAM TRACING: CPT | Performed by: EMERGENCY MEDICINE

## 2019-02-08 PROCEDURE — 70450 CT HEAD/BRAIN W/O DYE: CPT

## 2019-02-08 PROCEDURE — 36415 COLL VENOUS BLD VENIPUNCTURE: CPT

## 2019-02-08 PROCEDURE — 85025 COMPLETE CBC W/AUTO DIFF WBC: CPT

## 2019-02-08 ASSESSMENT — PAIN DESCRIPTION - DESCRIPTORS: DESCRIPTORS: ACHING

## 2019-02-08 ASSESSMENT — LIFESTYLE VARIABLES: DO YOU DRINK ALCOHOL: NO

## 2019-02-09 VITALS
HEART RATE: 75 BPM | TEMPERATURE: 97.8 F | BODY MASS INDEX: 28.58 KG/M2 | WEIGHT: 222.66 LBS | HEIGHT: 74 IN | SYSTOLIC BLOOD PRESSURE: 103 MMHG | DIASTOLIC BLOOD PRESSURE: 37 MMHG | RESPIRATION RATE: 18 BRPM | OXYGEN SATURATION: 94 %

## 2019-02-09 LAB
ALBUMIN SERPL BCP-MCNC: 3.3 G/DL (ref 3.2–4.9)
ALBUMIN/GLOB SERPL: 1.2 G/DL
ALP SERPL-CCNC: 83 U/L (ref 30–99)
ALT SERPL-CCNC: 15 U/L (ref 2–50)
ANION GAP SERPL CALC-SCNC: 9 MMOL/L (ref 0–11.9)
AST SERPL-CCNC: 80 U/L (ref 12–45)
BILIRUB SERPL-MCNC: 0.7 MG/DL (ref 0.1–1.5)
BUN SERPL-MCNC: 26 MG/DL (ref 8–22)
CALCIUM SERPL-MCNC: 8.8 MG/DL (ref 8.5–10.5)
CHLORIDE SERPL-SCNC: 106 MMOL/L (ref 96–112)
CO2 SERPL-SCNC: 22 MMOL/L (ref 20–33)
CREAT SERPL-MCNC: 1.4 MG/DL (ref 0.5–1.4)
EKG IMPRESSION: NORMAL
GLOBULIN SER CALC-MCNC: 2.8 G/DL (ref 1.9–3.5)
GLUCOSE SERPL-MCNC: 97 MG/DL (ref 65–99)
POTASSIUM SERPL-SCNC: 3.9 MMOL/L (ref 3.6–5.5)
PROT SERPL-MCNC: 6.1 G/DL (ref 6–8.2)
SODIUM SERPL-SCNC: 137 MMOL/L (ref 135–145)
TROPONIN I SERPL-MCNC: 0.07 NG/ML (ref 0–0.04)

## 2019-02-09 NOTE — ED NOTES
Pt unable to comply with staying in chair. Pt assisted back into bed. Rails up. Call light within reach. Waiting on transport.

## 2019-02-09 NOTE — DISCHARGE PLANNING
Medical Social Work     LIANET received a call from the RN requesting SW assistance with [facility] transportation home. The pt is transporting to [facility] [address], [place]. LIANET called and spoke with Vianca and advised her of the pt going back to there facility. LIANET faxed a PCS to [facility] and set up transportation with Urszula for 0130.  RN notified.

## 2019-02-09 NOTE — ED NOTES
D/C instructions provided to patient at bedside, verbalizes understanding and states plans for follow-up with PCP as recommended.   IV removed by break RN.   All belongings accounted for, all questions answered at this time.   Report to [facility] paramedic and EMT. Pt taken by [facility] back to [facility] in [place].

## 2019-02-09 NOTE — ED NOTES
Pt ripping off leads and O2. Pt educated on leaving both on. Pt reconnected and put back on O2.   All tests have resulted. Pt's chart up for re-eval.

## 2019-02-09 NOTE — ED NOTES
"Pt requesting to stand, stating, \"this bed is so uncomfortable, I need to get up.\" Pt placed in chair with lap belt, educated on needing to call RN for assistance. Pt agreeable to POC.   ERP back to bedside.   "

## 2019-02-09 NOTE — ED NOTES
Pt complaining of aching chest pain. Pt believes this pain is different than the rib cage pain from falling this evening. ERP notified.   EKG obtained. Blood collected and tubed to lab.

## 2019-02-09 NOTE — ED TRIAGE NOTES
"Paulie Melgar  78 y.o. male  Chief Complaint   Patient presents with   • T-5000 GLF     Pt reports falling out of the bed this evening. He states his pain is 9/10 in LEFT rib cage and LEFT head. Pt unsure if he lost conciousness because he was \"sleeping.\" + blood thinners.    • Rib Pain   • Head Pain       Pt BIB EMS from [facility] [place] for above CC.    Pt is A&Ox3, disoriented to time. Neuro intact. Pt placed on 2L NC.       Blood Pressure : (!) 103/37, Pulse: 66, Respiration: 19, Temperature: 36.6 °C (97.8 °F), Height: 188 cm (6' 2\"), Weight: 101 kg (222 lb 10.6 oz), BMI (Calculated): 28.59, BSA (Calculated): 2.3, Pulse Oximetry: 98 %, O2 (LPM): 2, O2 Delivery: Silicone Nasal Cannula    "

## 2019-02-09 NOTE — ED PROVIDER NOTES
"ED Provider Note    CHIEF COMPLAINT  Chief Complaint   Patient presents with   • T-5000 GLF     Pt reports falling out of the bed this evening. He states his pain is 9/10 in LEFT rib cage and LEFT head. Pt unsure if he lost conciousness because he was \"sleeping.\" + blood thinners.    • Rib Pain   • Head Pain       HPI  Paulie Melgar is a 78 y.o. male who presents With left lateral head pain to the temporal region and left lateral chest wall pain.  He states that he was sleeping at the time.  He remembers going to bed about 2 hours earlier.  He states that he woke up on the ground in pain and unable to stand.  Denies any neck or back pain.  No difficulty with his breathing.  No nausea or vomiting.  No hip pain.  No shoulder pain.  He believes that he rolled out of bed by accident.  The patient is on Xarelto for afib history.    REVIEW OF SYSTEMS  See HPI for further details. All other systems are negative.     PAST MEDICAL HISTORY   has a past medical history of Cancer (HCC) (1990); GERD (gastroesophageal reflux disease); Hodgkins disease (HCC) (2/2/2010); Hypertension; Hypothyroid (2/2/2010); Insomnia (3/22/2010); Melanoma (HCC) (2/2/2010); Melanoma in situ of back (HCC) (2/2/2010); Neuropathy (HCC) (2/11/2010); Syncopal episodes (2/2/2010); and Thyroid disease.    SOCIAL HISTORY  Social History     Social History Main Topics   • Smoking status: Former Smoker     Quit date: 2/2/2000   • Smokeless tobacco: Never Used      Comment: smoked for 35 yrs before he quit in 2000   • Alcohol use No   • Drug use: No   • Sexual activity: Not on file       SURGICAL HISTORY   has a past surgical history that includes appendectomy and abdominal exploration.    CURRENT MEDICATIONS  Home Medications     Reviewed by Yayo Cohen R.N. (Registered Nurse) on 02/08/19 at 2257  Med List Status: Partial   Medication Last Dose Status   aspirin EC 81 MG EC tablet  Active   atorvastatin (LIPITOR) 80 MG tablet  Active   Capsaicin " "(CAPZASIN-HP) 0.1 % Cream  Active   carvedilol (COREG) 3.125 MG Tab  Active   cloNIDine (CATAPRES) 0.1 MG/24HR PATCH WEEKLY  Active   digoxin (LANOXIN) 250 MCG Tab  Active   divalproex (DEPAKOTE) 250 MG Tablet Delayed Response  Active   donepezil (ARICEPT) 5 MG Tab  Active   escitalopram (LEXAPRO) 20 MG tablet  Active   levothyroxine (SYNTHROID) 25 MCG Tab  Active   lisinopril (PRINIVIL) 2.5 MG Tab  Active   omeprazole (PRILOSEC) 20 MG delayed-release capsule  Active   QUEtiapine (SEROQUEL) 100 MG Tab  Active   rivaroxaban (XARELTO) 20 MG Tab tablet  Active                ALLERGIES  Allergies   Allergen Reactions   • Pcn [Penicillins] Anaphylaxis       PHYSICAL EXAM  VITAL SIGNS: BP (!) 103/37   Pulse 66   Temp 36.6 °C (97.8 °F) (Temporal)   Resp 19   Ht 1.88 m (6' 2\")   Wt 101 kg (222 lb 10.6 oz)   SpO2 98%   BMI 28.59 kg/m²   Pulse ox interpretation: I interpret this pulse ox as normal.  Constitutional: Alert in no apparent distress.  HENT: No signs of trauma, left temporal tenderness, bilateral external ears normal, Nose normal.   Eyes: Pupils are equal and reactive, Conjunctiva normal, Non-icteric.   Neck: Normal range of motion, No tenderness, Supple, No stridor.   Lymphatic: No lymphadenopathy noted.   Cardiovascular: Regular rate and rhythm, no murmurs.   Thorax & Lungs: Normal breath sounds, No respiratory distress, No wheezing, left lateral chest tenderness.   Abdomen: Bowel sounds normal, Soft, No tenderness, No masses, No pulsatile masses. No peritoneal signs.  Skin: Warm, Dry, No erythema, No rash.   Back: No bony tenderness, No CVA tenderness.   Extremities: Intact distal pulses, No edema, No tenderness, No cyanosis  Musculoskeletal: Good range of motion in all major joints. No tenderness to palpation or major deformities noted.   Neurologic: Alert , Normal motor function and gait, Normal sensory function, No focal deficits noted.   Psychiatric: Affect normal, Judgment normal, Mood normal. "       DIAGNOSTIC STUDIES / PROCEDURES    EKG  Results for orders placed or performed during the hospital encounter of 19   EKG   Result Value Ref Range    Report       [facility]    Test Date:  2019  Pt Name:    BRIAN BRODY                    Department: ER  MRN:        [id number]                      Room:        13  Gender:     Male                         Technician: [id number]  :        1940                   Requested By:SEVEN LIU  Order #:    [id number]                    Reading MD: SEVEN LIU MD    Measurements  Intervals                                Axis  Rate:       76                           P:  CO:                                      QRS:        -88  QRSD:       152                          T:          70  QT:         440  QTc:        495    Interpretive Statements  ATRIAL FIBRILLATION  RIGHT BUNDLE BRANCH BLOCK  Compared to ECG 2019 01:23:19    Electronically Signed On 2019 0:05:41 PST by SEVEN LIU MD           LABS  Labs Reviewed   TROPONIN - Abnormal; Notable for the following:        Result Value    Troponin I 0.07 (*)     All other components within normal limits   CBC WITH DIFFERENTIAL - Abnormal; Notable for the following:     RBC 4.54 (*)     Hemoglobin 12.7 (*)     Hematocrit 40.0 (*)     MCHC 31.8 (*)     RDW 52.8 (*)     Neutrophils-Polys 80.50 (*)     Lymphocytes 12.60 (*)     Neutrophils (Absolute) 8.50 (*)     All other components within normal limits   COMP METABOLIC PANEL - Abnormal; Notable for the following:     Bun 26 (*)     AST(SGOT) 80 (*)     All other components within normal limits   ESTIMATED GFR - Abnormal; Notable for the following:     GFR If  59 (*)     GFR If Non  49 (*)     All other components within normal limits         RADIOLOGY  DX-CHEST-PORTABLE (1 VIEW)   Final Result      No acute cardiopulmonary abnormality.      CT-HEAD W/O   Final Result      1.  No acute  intracranial abnormality.   2.  Age-consistent atrophy and chronic white matter changes.               INTERPRETING LOCATION:  [address] [place] [address], [place], [address]            COURSE & MEDICAL DECISION MAKING  Pertinent Labs & Imaging studies reviewed. (See chart for details)  78 y.o. male presenting after a fall out of bed.  He is presenting from a nursing home.  He states that he has left lateral head pain along with left lateral chest pain.  Denies any difficulty with breathing.  No nausea or vomiting.  No neck pain or back pain.  No abdominal, head, extremity pain.  Patient states that he was unable to stand after this event.  EMS was contacted and the patient was brought here for further evaluation.  He is on Xarelto for atrial fibrillation.  No obvious focal neurologic deficits on physical examination.  No change in vision.  No eyes open wounds to his head.  CT of the head was performed for further evaluation given the patient's anticoagulated status, continued pain, without recollection of injury concerning for loss of consciousness.  Moving all extremities without difficulty.  No hip tenderness.  This left lateral chest wall tenderness without crepitance or subcutaneous emphysema.  Clear breath sounds bilaterally.  Chest x-ray is unremarkable or evidence of pneumothorax.    During the patient's evaluation here, he was worked up for his chest pain as well with EKG and troponin.  Has a slightly elevated troponin however has known CKD and recent markedly elevated troponin level to 360 which was treated with medical management as cardiology did not believe the patient had a stentable lesion on catheterization.  Troponin is much less than previously seen.  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      Patient is resting comfortably here in the emergency department.  He does have reproducible left lateral chest wall pain which is most likely secondary to traumatic  "injury rather than ACS.  Recommending outpatient management with primary care physician and cardiology for further management.  To return immediately for any worsening of his symptoms or development of any other concerning signs or symptoms.  Patient is stable for discharge.    The patient will return for worsening symptoms or failure of improvement and is stable at the time of discharge. The patient verbalizes understanding in their own words.    BP (!) 103/37   Pulse 75   Temp 36.6 °C (97.8 °F) (Temporal)   Resp 18   Ht 1.88 m (6' 2\")   Wt 101 kg (222 lb 10.6 oz)   SpO2 94%   BMI 28.59 kg/m²     Usama Pedersen M.D.  [address] [place] [address]  [facility] [address]  [phone]    Schedule an appointment as soon as possible for a visit       [facility], Emergency Dept  [address] [facility] [address] [phone]    As needed, If symptoms worsen      FINAL IMPRESSION  1. Fall, initial encounter    2. Contusion of left chest wall, initial encounter            Electronically signed by: Asher Mark, 2/8/2019 10:57 PM    "

## 2019-11-26 NOTE — ED TRIAGE NOTES
".Paulie Melgar  .  Chief Complaint   Patient presents with   • Chest Pain     patient reports onset of mid chest pain radiating to right arm.     Patient BIB [facility] from [facility] with above complaint. PIV placed pta, patient received x 3 nitro, 500mL NS, and 324mg ASA. ./63   Pulse 68   Temp 36.3 °C (97.4 °F) (Temporal)   Resp 18   Ht 1.88 m (6' 2\")   Wt 86 kg (189 lb 9.5 oz)   SpO2 97%   BMI 24.34 kg/m²     ERP to see.  EKG completed.   Blood drawn and sent to lab.  " DR. caldwell

## 2021-01-14 DIAGNOSIS — Z23 NEED FOR VACCINATION: ICD-10-CM
